# Patient Record
Sex: FEMALE | Race: BLACK OR AFRICAN AMERICAN | NOT HISPANIC OR LATINO | Employment: UNEMPLOYED | URBAN - METROPOLITAN AREA
[De-identification: names, ages, dates, MRNs, and addresses within clinical notes are randomized per-mention and may not be internally consistent; named-entity substitution may affect disease eponyms.]

---

## 2019-06-23 ENCOUNTER — HOSPITAL ENCOUNTER (EMERGENCY)
Facility: HOSPITAL | Age: 29
Discharge: HOME | End: 2019-06-23
Attending: EMERGENCY MEDICINE
Payer: MEDICAID

## 2019-06-23 VITALS
TEMPERATURE: 98 F | SYSTOLIC BLOOD PRESSURE: 127 MMHG | WEIGHT: 154 LBS | BODY MASS INDEX: 24.75 KG/M2 | RESPIRATION RATE: 16 BRPM | HEIGHT: 66 IN | OXYGEN SATURATION: 100 % | HEART RATE: 67 BPM | DIASTOLIC BLOOD PRESSURE: 67 MMHG

## 2019-06-23 DIAGNOSIS — N30.90 CYSTITIS: ICD-10-CM

## 2019-06-23 DIAGNOSIS — R30.0 DYSURIA: ICD-10-CM

## 2019-06-23 DIAGNOSIS — N76.0 BV (BACTERIAL VAGINOSIS): Primary | ICD-10-CM

## 2019-06-23 DIAGNOSIS — B96.89 BV (BACTERIAL VAGINOSIS): Primary | ICD-10-CM

## 2019-06-23 LAB
B-HCG UR QL: NEGATIVE
BACTERIA #/AREA URNS AUTO: ABNORMAL /HPF
BACTERIA GENITAL QL WET PREP: ABNORMAL
BILIRUB UR QL STRIP: NEGATIVE
CLARITY UR REFRACT.AUTO: ABNORMAL
CLUE CELLS VAG QL WET PREP: ABNORMAL
COLOR UR AUTO: YELLOW
CTP QC/QA: YES
FILAMENT FUNGI VAG WET PREP-#/AREA: ABNORMAL
GLUCOSE UR QL STRIP: NEGATIVE
HGB UR QL STRIP: NEGATIVE
KETONES UR QL STRIP: NEGATIVE
LEUKOCYTE ESTERASE UR QL STRIP: ABNORMAL
MICROSCOPIC COMMENT: ABNORMAL
NITRITE UR QL STRIP: NEGATIVE
PH UR STRIP: 7 [PH] (ref 5–8)
PROT UR QL STRIP: NEGATIVE
RBC #/AREA URNS AUTO: 2 /HPF (ref 0–4)
SP GR UR STRIP: 1.02 (ref 1–1.03)
SPECIMEN SOURCE: ABNORMAL
SQUAMOUS #/AREA URNS AUTO: 5 /HPF
T VAGINALIS GENITAL QL WET PREP: ABNORMAL
URN SPEC COLLECT METH UR: ABNORMAL
WBC #/AREA URNS AUTO: 76 /HPF (ref 0–5)
WBC #/AREA VAG WET PREP: ABNORMAL
YEAST GENITAL QL WET PREP: ABNORMAL

## 2019-06-23 PROCEDURE — 87491 CHLMYD TRACH DNA AMP PROBE: CPT

## 2019-06-23 PROCEDURE — 81025 URINE PREGNANCY TEST: CPT | Performed by: PHYSICIAN ASSISTANT

## 2019-06-23 PROCEDURE — 81001 URINALYSIS AUTO W/SCOPE: CPT

## 2019-06-23 PROCEDURE — 99284 EMERGENCY DEPT VISIT MOD MDM: CPT

## 2019-06-23 PROCEDURE — 99284 EMERGENCY DEPT VISIT MOD MDM: CPT | Mod: ,,, | Performed by: PHYSICIAN ASSISTANT

## 2019-06-23 PROCEDURE — 87086 URINE CULTURE/COLONY COUNT: CPT

## 2019-06-23 PROCEDURE — 87210 SMEAR WET MOUNT SALINE/INK: CPT

## 2019-06-23 PROCEDURE — 87088 URINE BACTERIA CULTURE: CPT

## 2019-06-23 PROCEDURE — 99284 PR EMERGENCY DEPT VISIT,LEVEL IV: ICD-10-PCS | Mod: ,,, | Performed by: PHYSICIAN ASSISTANT

## 2019-06-23 PROCEDURE — 25000003 PHARM REV CODE 250: Performed by: PHYSICIAN ASSISTANT

## 2019-06-23 RX ORDER — NITROFURANTOIN 25; 75 MG/1; MG/1
100 CAPSULE ORAL
Status: COMPLETED | OUTPATIENT
Start: 2019-06-23 | End: 2019-06-23

## 2019-06-23 RX ORDER — PHENAZOPYRIDINE HYDROCHLORIDE 100 MG/1
100 TABLET, FILM COATED ORAL
Status: COMPLETED | OUTPATIENT
Start: 2019-06-23 | End: 2019-06-23

## 2019-06-23 RX ORDER — PHENAZOPYRIDINE HYDROCHLORIDE 200 MG/1
200 TABLET, FILM COATED ORAL 3 TIMES DAILY
Qty: 5 TABLET | Refills: 0 | Status: SHIPPED | OUTPATIENT
Start: 2019-06-23 | End: 2019-06-25

## 2019-06-23 RX ORDER — NITROFURANTOIN 25; 75 MG/1; MG/1
100 CAPSULE ORAL 2 TIMES DAILY
Qty: 9 CAPSULE | Refills: 0 | Status: SHIPPED | OUTPATIENT
Start: 2019-06-23 | End: 2019-06-28

## 2019-06-23 RX ORDER — METRONIDAZOLE 500 MG/1
500 TABLET ORAL EVERY 12 HOURS
Qty: 14 TABLET | Refills: 0 | Status: SHIPPED | OUTPATIENT
Start: 2019-06-23 | End: 2019-06-30

## 2019-06-23 RX ADMIN — NITROFURANTOIN (MONOHYDRATE/MACROCRYSTALS) 100 MG: 75; 25 CAPSULE ORAL at 11:06

## 2019-06-23 RX ADMIN — PHENAZOPYRIDINE HYDROCHLORIDE 100 MG: 100 TABLET ORAL at 11:06

## 2019-06-23 NOTE — ED PROVIDER NOTES
"Encounter Date: 2019       History     Chief Complaint   Patient presents with    Female  Problem     LMP "late May"      Ms Sarmiento is a 28yoF presents for dysuria and vaginal discharge; pertinent PMHx  ( section x3).  Patient reports several days of dysuria, pelvic pressure while urinating, urinary frequency and mild amount of thick, white, non malodorous vaginal discharge. She says the symptoms feel similar to prior UTI.  Patient is sexually active with 1 partner and is not concerned for STDs at this time that would like to proceed with pelvic exam.  She recently stopped using the Depo-Provera shot and is not try and conceive at this time.  LMP late May.  She denies associated painful/painless vulvar lesion, fever/chills, decreased appetite, nausea/vomiting, abdominal pain, new back pain, melena, hematochezia, diarrhea, chest pain, shortness breath, cough, weakness, dizziness.  The patients available PMH, PSH, Social History, medications, allergies, and triage vital signs were reviewed just prior to their medical evaluation.  A ten point review of systems was completed and is negative except as documented above.  Patient denies any other acute medical complaint.          Review of patient's allergies indicates:  No Known Allergies  History reviewed. No pertinent past medical history.  Past Surgical History:   Procedure Laterality Date     SECTION      x3     History reviewed. No pertinent family history.  Social History     Tobacco Use    Smoking status: Never Smoker    Smokeless tobacco: Never Used   Substance Use Topics    Alcohol use: Yes    Drug use: Never     Review of Systems   Constitutional: Negative for appetite change, chills and fever.   HENT: Negative for sore throat and trouble swallowing.    Respiratory: Negative for cough and shortness of breath.    Cardiovascular: Negative for chest pain.   Gastrointestinal: Negative for abdominal pain, blood in stool, diarrhea, " nausea and vomiting.   Genitourinary: Positive for dysuria, frequency, urgency and vaginal discharge. Negative for decreased urine volume, difficulty urinating, flank pain, hematuria and vaginal bleeding. Pelvic pain: Cramping while urinating.   Musculoskeletal: Negative for back pain and neck pain.   Skin: Negative for pallor and rash.   Neurological: Negative for dizziness and weakness.       Physical Exam     Initial Vitals [06/23/19 1009]   BP Pulse Resp Temp SpO2   (!) 101/59 75 16 97.8 °F (36.6 °C) 99 %      MAP       --         Physical Exam    Vitals reviewed.  Constitutional: She appears well-developed and well-nourished. She is not diaphoretic. No distress.   Well-appearing female in NAD, VSS, afebrile, 99% on RA.     HENT:   Head: Normocephalic and atraumatic.   Right Ear: External ear normal.   Left Ear: External ear normal.   Nose: Nose normal.   Mouth/Throat: Oropharynx is clear and moist. No oropharyngeal exudate.   Eyes: Conjunctivae and EOM are normal. Pupils are equal, round, and reactive to light. No scleral icterus.   Neck: Normal range of motion. Neck supple.   Cardiovascular: Normal rate, regular rhythm and intact distal pulses.   Pulmonary/Chest: Breath sounds normal.   Abdominal: Soft. Bowel sounds are normal. There is no tenderness. There is no rebound and no guarding.   Genitourinary: Uterus normal. Pelvic exam was performed with patient prone. There is no tenderness or lesion on the right labia. There is no tenderness or lesion on the left labia. Cervix exhibits no motion tenderness and no friability. Right adnexum displays no mass and no fullness. Tenderness:  very mild. Left adnexum displays no mass, no tenderness and no fullness. Vaginal discharge (Mild, thin, white, consistent with physiologic discharge, no malodor) found.   Musculoskeletal: Normal range of motion. She exhibits no edema.   Neurological: She is alert and oriented to person, place, and time. She has normal strength. No  cranial nerve deficit or sensory deficit.   Skin: Skin is warm and dry. Capillary refill takes less than 2 seconds. No rash noted. No erythema. No pallor.   Psychiatric: She has a normal mood and affect. Her behavior is normal. Judgment and thought content normal.         ED Course   Procedures  Labs Reviewed   URINALYSIS, REFLEX TO URINE CULTURE - Abnormal; Notable for the following components:       Result Value    Appearance, UA Hazy (*)     Leukocytes, UA 2+ (*)     All other components within normal limits    Narrative:     Preferred Collection Type->Urine, Clean Catch   VAGINAL SCREEN - Abnormal; Notable for the following components:    Clue Cells Few (*)     WBC - Vaginal Screen Rare (*)     Bacteria - Vaginal Screen Few (*)     All other components within normal limits   URINALYSIS MICROSCOPIC - Abnormal; Notable for the following components:    WBC, UA 76 (*)     All other components within normal limits    Narrative:     Preferred Collection Type->Urine, Clean Catch   C. TRACHOMATIS/N. GONORRHOEAE BY AMP DNA   CULTURE, URINE   POCT URINE PREGNANCY          Imaging Results    None          Medical Decision Making:   History:   Old Medical Records: I decided to obtain old medical records.  Old Records Summarized: records from clinic visits and records from previous admission(s).  Initial Assessment:   The patient presents with dysuria and urinary frequency with mild amount of thin, white vaginal discharge on exam, VSS, afebrile, no constitutional symptoms  Differential Diagnosis:   DDx includes UTI, BV, physiologic discharge. Physical exam and history taking lower clinical suspicion for pregnancy, TOA, PID, pyelonephritis, ureterolithiasis.  Clinical Tests:   Lab Tests: Ordered and Reviewed  ED Management:  UA consistent with cystitis, no prior sensitivities.  Will cover with Macrobid and Pyridium, 1st dose in ED.  Wet prep shows few amount of clue cells with bacteria, will cover for BV given symptoms with  Flagyl. Patient agreed to plan of care and voiced understanding. Discharged in stable condition with strict ED return precautions.    Oliva Childs PA-C  06/23/2019    I discussed the following case, diagnosis and plan of care with attending physician.                        Clinical Impression:       ICD-10-CM ICD-9-CM   1. BV (bacterial vaginosis) N76.0 616.10    B96.89 041.9   2. Dysuria R30.0 788.1   3. Cystitis N30.90 595.9         Disposition:   Disposition: Discharged  Condition: Stable                        Oliva Childs PA-C  06/23/19 1208

## 2019-06-23 NOTE — DISCHARGE INSTRUCTIONS
Take all antibiotics to completion, even if symptoms improve.  Use Pyridium as needed for bladder pain. Return to the ED immediately if you develop severe back pain, fever or uncontrollable vomiting.    Our goal in the emergency department is to always give you outstanding care and exceptional service. You may receive a survey by mail or e-mail in the next week regarding your experience in our ED. We would greatly appreciate your completing and returning the survey. Your feedback provides us with a way to recognize our staff who give very good care and it helps us learn how to improve when your experience was below our aspiration of excellence.

## 2019-06-23 NOTE — ED TRIAGE NOTES
C/o frequent urination, pressure, pain with urination, and thick white discharge without odor. Pt reports some vaginal itching, denies sores or lesions. Denies n/v/d. Denies fever. Pt denies regular cycle, recently stopped using depo provera birth control shot, not attempting to conceive.

## 2019-06-24 LAB
BACTERIA UR CULT: NORMAL
C TRACH DNA SPEC QL NAA+PROBE: NOT DETECTED
N GONORRHOEA DNA SPEC QL NAA+PROBE: NOT DETECTED

## 2019-09-30 ENCOUNTER — HOSPITAL ENCOUNTER (EMERGENCY)
Facility: HOSPITAL | Age: 29
Discharge: HOME | End: 2019-09-30
Attending: EMERGENCY MEDICINE
Payer: MEDICAID

## 2019-09-30 VITALS
OXYGEN SATURATION: 98 % | RESPIRATION RATE: 15 BRPM | TEMPERATURE: 98 F | WEIGHT: 150 LBS | BODY MASS INDEX: 24.11 KG/M2 | HEART RATE: 72 BPM | SYSTOLIC BLOOD PRESSURE: 108 MMHG | DIASTOLIC BLOOD PRESSURE: 70 MMHG | HEIGHT: 66 IN

## 2019-09-30 DIAGNOSIS — B96.89 BV (BACTERIAL VAGINOSIS): Primary | ICD-10-CM

## 2019-09-30 DIAGNOSIS — N76.0 BV (BACTERIAL VAGINOSIS): Primary | ICD-10-CM

## 2019-09-30 LAB
B-HCG UR QL: NEGATIVE
BACTERIA GENITAL QL WET PREP: ABNORMAL
CLUE CELLS VAG QL WET PREP: ABNORMAL
CTP QC/QA: YES
FILAMENT FUNGI VAG WET PREP-#/AREA: ABNORMAL
SPECIMEN SOURCE: ABNORMAL
T VAGINALIS GENITAL QL WET PREP: ABNORMAL
WBC #/AREA VAG WET PREP: ABNORMAL
YEAST GENITAL QL WET PREP: ABNORMAL

## 2019-09-30 PROCEDURE — 81025 URINE PREGNANCY TEST: CPT | Performed by: PHYSICIAN ASSISTANT

## 2019-09-30 PROCEDURE — 99284 PR EMERGENCY DEPT VISIT,LEVEL IV: ICD-10-PCS | Mod: ,,, | Performed by: PHYSICIAN ASSISTANT

## 2019-09-30 PROCEDURE — 99284 EMERGENCY DEPT VISIT MOD MDM: CPT | Mod: 25

## 2019-09-30 PROCEDURE — 87210 SMEAR WET MOUNT SALINE/INK: CPT

## 2019-09-30 PROCEDURE — 99284 EMERGENCY DEPT VISIT MOD MDM: CPT | Mod: ,,, | Performed by: PHYSICIAN ASSISTANT

## 2019-09-30 PROCEDURE — 87491 CHLMYD TRACH DNA AMP PROBE: CPT

## 2019-09-30 RX ORDER — METRONIDAZOLE 500 MG/1
500 TABLET ORAL EVERY 12 HOURS
Qty: 14 TABLET | Refills: 0 | Status: SHIPPED | OUTPATIENT
Start: 2019-09-30 | End: 2019-10-07

## 2019-09-30 NOTE — ED PROVIDER NOTES
Encounter Date: 2019       History     Chief Complaint   Patient presents with    Vaginal Discharge     Reports vaginal discharge and possible foul smell     This is a 29-year-old female with no known significant past medical history who presents to the ED with a chief complaint of vaginal discharge. Patient reports last menstrual cycle 2 weeks ago.  She reports a one-week history of white vaginal discharge.  Patient denies fever, chills, nausea/vomiting, abdominal pain, pelvic pain vaginal irritation, dysuria, diarrhea, vaginal lesions.  She is in a monogamous relationship with 1 partner and reports using protection with intercourse.  Denies concern for sexually transmitted infection.        Review of patient's allergies indicates:  No Known Allergies  No past medical history on file.  Past Surgical History:   Procedure Laterality Date     SECTION      x3     No family history on file.  Social History     Tobacco Use    Smoking status: Never Smoker    Smokeless tobacco: Never Used   Substance Use Topics    Alcohol use: Yes    Drug use: Never     Review of Systems   Constitutional: Negative for fever.   HENT: Negative for sore throat.    Respiratory: Negative for shortness of breath.    Cardiovascular: Negative for chest pain.   Gastrointestinal: Negative for nausea and vomiting.   Genitourinary: Positive for vaginal discharge. Negative for dysuria, menstrual problem, pelvic pain and vaginal bleeding.   Musculoskeletal: Negative for back pain.   Skin: Negative for rash.   Neurological: Negative for weakness.   Hematological: Does not bruise/bleed easily.       Physical Exam     Initial Vitals [19 1443]   BP Pulse Resp Temp SpO2   108/70 72 15 98 °F (36.7 °C) 98 %      MAP       --         Physical Exam    Constitutional: She appears well-developed and well-nourished. No distress.   HENT:   Head: Atraumatic.   Eyes: Conjunctivae and EOM are normal. Pupils are equal, round, and reactive to  light.   Cardiovascular: Normal rate, regular rhythm and normal heart sounds.   Pulmonary/Chest: Breath sounds normal. No respiratory distress. She has no wheezes. She has no rhonchi. She has no rales.   Abdominal: Soft. Bowel sounds are normal. There is no tenderness. There is no rebound and no guarding.   Genitourinary: There is no rash on the right labia. There is no rash on the left labia. Uterus is not tender. Cervix exhibits no motion tenderness and no friability. Right adnexum displays no tenderness. Left adnexum displays no tenderness. No erythema or bleeding in the vagina. Vaginal discharge found.   Neurological: She is alert and oriented to person, place, and time.   Skin: Skin is warm and dry. No rash noted.         ED Course   Procedures  Labs Reviewed   VAGINAL SCREEN - Abnormal; Notable for the following components:       Result Value    Clue Cells Moderate (*)     WBC - Vaginal Screen Rare (*)     Bacteria - Vaginal Screen Many (*)     All other components within normal limits   C. TRACHOMATIS/N. GONORRHOEAE BY AMP DNA   POCT URINE PREGNANCY          Imaging Results    None          Medical Decision Making:   Clinical Tests:   Lab Tests: Ordered and Reviewed       APC / Resident Notes:   29-year-old female presenting with vaginal discharge.  On exam she is afebrile and nontoxic.  Abdomen is soft, nontender.  Pelvic exam demonstrates white vaginal discharge with no cervical motion tenderness or adnexal tenderness.  No lesions.    DDx includes but is not limited to vaginitis, cervicitis, PID, pregnancy.    Patient was offered empiric treatment for GC/Chlamydia but declined.  Urine pregnancy is negative.  Vaginal screen positive for clue cells.  Discussed with patient.  She will be discharged with a 7 day course of Flagyl.  GC/chlamydia culture is pending.  Patient advised to follow up with her PCP.  Return to ED precautions given.  She is stable for discharge. I discussed the care of this patient with  my supervising MD.                  Clinical Impression:       ICD-10-CM ICD-9-CM   1. BV (bacterial vaginosis) N76.0 616.10    B96.89 041.9         Disposition:   Disposition: Discharged  Condition: Stable                        Alia Carreno PA-C  09/30/19 2421

## 2019-09-30 NOTE — ED NOTES
Patient arrives for evaluation of white vaginal discharge x 1 week without pain - denies pain on urination - denies lesions or rash or itching - patient states she uses protection during sexual intercourse and last menstrual period was 1 week

## 2019-10-01 LAB
C TRACH DNA SPEC QL NAA+PROBE: NOT DETECTED
N GONORRHOEA DNA SPEC QL NAA+PROBE: NOT DETECTED

## 2020-03-05 ENCOUNTER — HOSPITAL ENCOUNTER (EMERGENCY)
Facility: HOSPITAL | Age: 30
Discharge: HOME | End: 2020-03-05
Attending: EMERGENCY MEDICINE
Payer: MEDICAID

## 2020-03-05 VITALS
WEIGHT: 160 LBS | HEART RATE: 62 BPM | HEIGHT: 66 IN | OXYGEN SATURATION: 99 % | DIASTOLIC BLOOD PRESSURE: 87 MMHG | TEMPERATURE: 99 F | SYSTOLIC BLOOD PRESSURE: 129 MMHG | RESPIRATION RATE: 20 BRPM | BODY MASS INDEX: 25.71 KG/M2

## 2020-03-05 DIAGNOSIS — N93.9 VAGINAL BLEEDING: Primary | ICD-10-CM

## 2020-03-05 DIAGNOSIS — N39.0 URINARY TRACT INFECTION WITH HEMATURIA, SITE UNSPECIFIED: ICD-10-CM

## 2020-03-05 DIAGNOSIS — R31.9 URINARY TRACT INFECTION WITH HEMATURIA, SITE UNSPECIFIED: ICD-10-CM

## 2020-03-05 LAB
B-HCG UR QL: NEGATIVE
BACTERIA #/AREA URNS AUTO: ABNORMAL /HPF
BACTERIA GENITAL QL WET PREP: ABNORMAL
BASOPHILS # BLD AUTO: 0.02 K/UL (ref 0–0.2)
BASOPHILS NFR BLD: 0.5 % (ref 0–1.9)
BILIRUB UR QL STRIP: NEGATIVE
C TRACH DNA SPEC QL NAA+PROBE: NOT DETECTED
CLARITY UR REFRACT.AUTO: ABNORMAL
CLUE CELLS VAG QL WET PREP: ABNORMAL
COLOR UR AUTO: ABNORMAL
CTP QC/QA: YES
DIFFERENTIAL METHOD: ABNORMAL
EOSINOPHIL # BLD AUTO: 0 K/UL (ref 0–0.5)
EOSINOPHIL NFR BLD: 0.5 % (ref 0–8)
ERYTHROCYTE [DISTWIDTH] IN BLOOD BY AUTOMATED COUNT: 17.1 % (ref 11.5–14.5)
FILAMENT FUNGI VAG WET PREP-#/AREA: ABNORMAL
GLUCOSE UR QL STRIP: ABNORMAL
HCT VFR BLD AUTO: 34.1 % (ref 37–48.5)
HGB BLD-MCNC: 9.7 G/DL (ref 12–16)
HGB UR QL STRIP: ABNORMAL
HYALINE CASTS UR QL AUTO: 0 /LPF
IMM GRANULOCYTES # BLD AUTO: 0.01 K/UL (ref 0–0.04)
IMM GRANULOCYTES NFR BLD AUTO: 0.2 % (ref 0–0.5)
KETONES UR QL STRIP: NEGATIVE
LEUKOCYTE ESTERASE UR QL STRIP: NEGATIVE
LYMPHOCYTES # BLD AUTO: 1.8 K/UL (ref 1–4.8)
LYMPHOCYTES NFR BLD: 45.3 % (ref 18–48)
MCH RBC QN AUTO: 24.3 PG (ref 27–31)
MCHC RBC AUTO-ENTMCNC: 28.4 G/DL (ref 32–36)
MCV RBC AUTO: 85 FL (ref 82–98)
MICROSCOPIC COMMENT: ABNORMAL
MONOCYTES # BLD AUTO: 0.3 K/UL (ref 0.3–1)
MONOCYTES NFR BLD: 7.7 % (ref 4–15)
N GONORRHOEA DNA SPEC QL NAA+PROBE: NOT DETECTED
NEUTROPHILS # BLD AUTO: 1.9 K/UL (ref 1.8–7.7)
NEUTROPHILS NFR BLD: 45.8 % (ref 38–73)
NITRITE UR QL STRIP: NEGATIVE
NRBC BLD-RTO: 0 /100 WBC
PH UR STRIP: 6 [PH] (ref 5–8)
PLATELET # BLD AUTO: 293 K/UL (ref 150–350)
PMV BLD AUTO: 10 FL (ref 9.2–12.9)
PROT UR QL STRIP: ABNORMAL
RBC # BLD AUTO: 4 M/UL (ref 4–5.4)
RBC #/AREA URNS AUTO: >100 /HPF (ref 0–4)
SP GR UR STRIP: 1.03 (ref 1–1.03)
SPECIMEN SOURCE: ABNORMAL
SQUAMOUS #/AREA URNS AUTO: 30 /HPF
T VAGINALIS GENITAL QL WET PREP: ABNORMAL
URN SPEC COLLECT METH UR: ABNORMAL
WBC # BLD AUTO: 4.04 K/UL (ref 3.9–12.7)
WBC #/AREA URNS AUTO: 7 /HPF (ref 0–5)
WBC #/AREA VAG WET PREP: ABNORMAL
YEAST GENITAL QL WET PREP: ABNORMAL

## 2020-03-05 PROCEDURE — 81001 URINALYSIS AUTO W/SCOPE: CPT

## 2020-03-05 PROCEDURE — 85025 COMPLETE CBC W/AUTO DIFF WBC: CPT

## 2020-03-05 PROCEDURE — 99284 PR EMERGENCY DEPT VISIT,LEVEL IV: ICD-10-PCS | Mod: ,,, | Performed by: EMERGENCY MEDICINE

## 2020-03-05 PROCEDURE — 25000003 PHARM REV CODE 250: Performed by: EMERGENCY MEDICINE

## 2020-03-05 PROCEDURE — 96372 THER/PROPH/DIAG INJ SC/IM: CPT

## 2020-03-05 PROCEDURE — 87210 SMEAR WET MOUNT SALINE/INK: CPT

## 2020-03-05 PROCEDURE — 99284 EMERGENCY DEPT VISIT MOD MDM: CPT | Mod: 25

## 2020-03-05 PROCEDURE — 87491 CHLMYD TRACH DNA AMP PROBE: CPT

## 2020-03-05 PROCEDURE — 99284 EMERGENCY DEPT VISIT MOD MDM: CPT | Mod: ,,, | Performed by: EMERGENCY MEDICINE

## 2020-03-05 PROCEDURE — 81025 URINE PREGNANCY TEST: CPT | Performed by: EMERGENCY MEDICINE

## 2020-03-05 PROCEDURE — 63600175 PHARM REV CODE 636 W HCPCS: Performed by: EMERGENCY MEDICINE

## 2020-03-05 RX ORDER — AZITHROMYCIN 250 MG/1
1000 TABLET, FILM COATED ORAL
Status: COMPLETED | OUTPATIENT
Start: 2020-03-05 | End: 2020-03-05

## 2020-03-05 RX ORDER — CEFTRIAXONE 500 MG/1
500 INJECTION, POWDER, FOR SOLUTION INTRAMUSCULAR; INTRAVENOUS
Status: COMPLETED | OUTPATIENT
Start: 2020-03-05 | End: 2020-03-05

## 2020-03-05 RX ORDER — NITROFURANTOIN 25; 75 MG/1; MG/1
100 CAPSULE ORAL 2 TIMES DAILY
Qty: 10 CAPSULE | Refills: 0 | Status: SHIPPED | OUTPATIENT
Start: 2020-03-05 | End: 2020-03-10

## 2020-03-05 RX ADMIN — AZITHROMYCIN MONOHYDRATE 1000 MG: 250 TABLET ORAL at 01:03

## 2020-03-05 RX ADMIN — CEFTRIAXONE SODIUM 500 MG: 500 INJECTION, POWDER, FOR SOLUTION INTRAMUSCULAR; INTRAVENOUS at 01:03

## 2020-03-05 NOTE — ED PROVIDER NOTES
Encounter Date: 3/5/2020    SCRIBE #1 NOTE: I, Darshana Maurer, am scribing for, and in the presence of,  Dr. Madrid. I have scribed the entire note.       History     Chief Complaint   Patient presents with    Vaginal Bleeding     +UPT, vaginal bleeding and cramping onset today     Time patient was seen by the provider: 11:45 AM      The patient is a 29 y.o. Female, , who presents to the ED with a complaint of vaginal bleeding upon waking up this morning. The patient reports bleeding gradually worsened throughout the morning. Notes of white vaginal discharge that began a few days ago. She states she was incarcerated a few days ago and had 3 pregnancy tests done which were all positive. Her last menstrual period was approximately one month ago. All her prior pregnancies were normal, she had c-sections for all 3. Denies fever, chills, diarrhea, constipation, abdominal pain, dysuria, vaginal pain, vaginal sores or ulcers.       The history is provided by the patient and medical records.     Review of patient's allergies indicates:  No Known Allergies  History reviewed. No pertinent past medical history.  Past Surgical History:   Procedure Laterality Date     SECTION      x3     History reviewed. No pertinent family history.  Social History     Tobacco Use    Smoking status: Never Smoker    Smokeless tobacco: Never Used   Substance Use Topics    Alcohol use: Not Currently    Drug use: Never     Review of Systems   Constitutional: Negative for activity change, chills, diaphoresis and fever.   HENT: Negative for drooling, rhinorrhea, sore throat and trouble swallowing.    Eyes: Negative for pain and visual disturbance.   Respiratory: Negative for cough, shortness of breath and stridor.    Cardiovascular: Negative for chest pain.   Gastrointestinal: Negative for abdominal pain, constipation, diarrhea and nausea.   Genitourinary: Positive for vaginal bleeding and vaginal discharge. Negative for  dysuria, genital sores and vaginal pain.   Musculoskeletal: Negative for back pain.   Skin: Negative for rash.   Neurological: Negative for weakness.   Hematological: Does not bruise/bleed easily.       Physical Exam     Initial Vitals [03/05/20 1131]   BP Pulse Resp Temp SpO2   129/87 62 20 98.6 °F (37 °C) 99 %      MAP       --         Physical Exam    Nursing note and vitals reviewed.  Constitutional: She appears well-developed. She is not diaphoretic. No distress.   HENT:   Head: Normocephalic and atraumatic.   Nose: Nose normal.   Eyes: EOM are normal. No scleral icterus.   Neck: Normal range of motion. Neck supple.   Cardiovascular: Normal rate, regular rhythm, normal heart sounds and intact distal pulses. Exam reveals no gallop and no friction rub.    No murmur heard.  Pulmonary/Chest: Breath sounds normal. No stridor. No respiratory distress. She has no wheezes. She has no rhonchi. She has no rales.   Abdominal: Soft. Bowel sounds are normal. There is no tenderness.   Genitourinary:   Genitourinary Comments: Scant blood in vaginal vault. No abnormal vaginal discharge. No cervix motion tenderness.    Musculoskeletal: Normal range of motion.   Neurological: She is alert and oriented to person, place, and time. No cranial nerve deficit.   Skin: Skin is warm and dry. Capillary refill takes less than 2 seconds. No rash noted.   Psychiatric: She has a normal mood and affect.         ED Course   Procedures  Labs Reviewed   URINALYSIS, REFLEX TO URINE CULTURE - Abnormal; Notable for the following components:       Result Value    Color, UA Red (*)     Appearance, UA Cloudy (*)     Protein, UA 2+ (*)     Glucose, UA 1+ (*)     Occult Blood UA 3+ (*)     All other components within normal limits    Narrative:     Preferred Collection Type->Urine, Clean Catch   VAGINAL SCREEN - Abnormal; Notable for the following components:    WBC - Vaginal Screen Rare (*)     Bacteria - Vaginal Screen Rare (*)     All other  components within normal limits   CBC W/ AUTO DIFFERENTIAL - Abnormal; Notable for the following components:    Hemoglobin 9.7 (*)     Hematocrit 34.1 (*)     Mean Corpuscular Hemoglobin 24.3 (*)     Mean Corpuscular Hemoglobin Conc 28.4 (*)     RDW 17.1 (*)     All other components within normal limits   URINALYSIS MICROSCOPIC - Abnormal; Notable for the following components:    RBC, UA >100 (*)     WBC, UA 7 (*)     Bacteria Many (*)     All other components within normal limits    Narrative:     Preferred Collection Type->Urine, Clean Catch   C. TRACHOMATIS/N. GONORRHOEAE BY AMP DNA   POCT URINE PREGNANCY          Imaging Results    None          Medical Decision Making:   History:   Old Medical Records: I decided to obtain old medical records.  Clinical Tests:   Lab Tests: Ordered and Reviewed  ED Management:  29 year old female presents with 1 days vaginal bleeding most likely of nonemergent etiology. After negative urine pregnancy testing, CBC with only mild anemia, and benign pelvic exam, patient's bleeding thought to be secondary to fibroids or other non-emergent cause of abnormal uterine bleeding.    Hemorrhagic cystitis unlikely: No dysuria, grossly visualized blood per vagina  Unlikely PCOS, pituitary dysregulation, hypothyroidism: No changes in hair distribution, no weight change, no change in acne, no visual changes, no thermoregulatory changes, no new unexplained edema  Unlikely coagulopathy (vWD, ITP): No anticoagulation medication, PLTs nml, no other mucosal bleeding  Unlikely traumatic vaginal bleeding: no history of instrumentation, hard sex, or abuse, pelvic exam shows no signs of trauma, no foreign body visualized  Unlikely other infectious etiology: nonseptic appearance, no recent h/o instrumentation    Plan discharge home with return precautions, instruction for prompt outpatient follow, and iron supplementation (324mg ferrous sulfate tablet PO TID).              Scribe Attestation:   Scribe  #1: I performed the above scribed service and the documentation accurately describes the services I performed. I attest to the accuracy of the note.                          Clinical Impression:       ICD-10-CM ICD-9-CM   1. Vaginal bleeding N93.9 623.8   2. Urinary tract infection with hematuria, site unspecified N39.0 599.0    R31.9 599.70         Disposition:   Disposition: Discharged  Condition: Stable     ED Disposition Condition    Discharge Stable        ED Prescriptions     Medication Sig Dispense Start Date End Date Auth. Provider    nitrofurantoin, macrocrystal-monohydrate, (MACROBID) 100 MG capsule Take 1 capsule (100 mg total) by mouth 2 (two) times daily. for 5 days 10 capsule 3/5/2020 3/10/2020 Kai Madrid MD        Follow-up Information    None                                    Kai Madrid MD  03/05/20 3888

## 2020-03-05 NOTE — ED NOTES
Patient identifiers for Gilmer Sarmiento 29 y.o. female checked and correct.  Chief Complaint   Patient presents with    Vaginal Bleeding     +UPT, vaginal bleeding and cramping onset today     History reviewed. No pertinent past medical history.  Allergies reported: Review of patient's allergies indicates:  No Known Allergies      LOC: Patient is awake, alert, and aware of environment with an appropriate affect. Patient is oriented x 4 and speaking appropriately.  APPEARANCE: Patient resting comfortably and in no acute distress. Patient is clean and well groomed, patient's clothing is properly fastened.  SKIN: The skin is warm and dry. Patient has normal skin turgor and moist mucus membranes. Denies fever or chills.  MUSKULOSKELETAL: Patient is moving all extremities well, no obvious deformities noted. Pulses intact. Ambulatory by self.  RESPIRATORY: Airway is open and patent. Respirations are spontaneous and non-labored with normal effort and rate. Denies SOB.  CARDIAC: Patient has a normal rate and rhythm. 62 on cardiac monitor. No peripheral edema noted. Denies chest pain.  ABDOMEN: No distention noted. Soft and non-tender upon palpation. Patient believes she is having a miscarriage; states went to longterm and received a pregnancy test that was positive. Bleeding and abdominal cramping began today around 1000. States a lot of blood and small clots being passed. States abdominal pain is 6/10. Localized to lower pelvic area.  NEUROLOGICAL: PERRL. Facial expression is symmetrical. Hand grasps are equal bilaterally. Normal sensation in all extremities when touched with finger.

## 2020-09-08 ENCOUNTER — ANESTHESIA (OUTPATIENT)
Dept: SURGERY | Facility: OTHER | Age: 30
End: 2020-09-08
Payer: MEDICAID

## 2020-09-08 ENCOUNTER — ANESTHESIA EVENT (OUTPATIENT)
Dept: SURGERY | Facility: OTHER | Age: 30
End: 2020-09-08
Payer: MEDICAID

## 2020-09-08 ENCOUNTER — HOSPITAL ENCOUNTER (OUTPATIENT)
Facility: OTHER | Age: 30
Discharge: HOME | End: 2020-09-09
Attending: EMERGENCY MEDICINE | Admitting: EMERGENCY MEDICINE
Payer: MEDICAID

## 2020-09-08 DIAGNOSIS — O00.101 RIGHT TUBAL PREGNANCY WITHOUT INTRAUTERINE PREGNANCY: ICD-10-CM

## 2020-09-08 DIAGNOSIS — O00.90 ECTOPIC PREGNANCY, UNSPECIFIED LOCATION, UNSPECIFIED WHETHER INTRAUTERINE PREGNANCY PRESENT: Primary | ICD-10-CM

## 2020-09-08 PROCEDURE — 88305 TISSUE EXAM BY PATHOLOGIST: CPT | Mod: 26,,, | Performed by: PATHOLOGY

## 2020-09-08 PROCEDURE — 25000003 PHARM REV CODE 250: Performed by: NURSE ANESTHETIST, CERTIFIED REGISTERED

## 2020-09-08 PROCEDURE — 99285 EMERGENCY DEPT VISIT HI MDM: CPT | Mod: 25

## 2020-09-08 PROCEDURE — 37000009 HC ANESTHESIA EA ADD 15 MINS: Performed by: OBSTETRICS & GYNECOLOGY

## 2020-09-08 PROCEDURE — 00840 ANES IPER PX LOWER ABD NOS: CPT | Performed by: OBSTETRICS & GYNECOLOGY

## 2020-09-08 PROCEDURE — 36000708 HC OR TIME LEV III 1ST 15 MIN: Performed by: OBSTETRICS & GYNECOLOGY

## 2020-09-08 PROCEDURE — 37000008 HC ANESTHESIA 1ST 15 MINUTES: Performed by: OBSTETRICS & GYNECOLOGY

## 2020-09-08 PROCEDURE — 25000003 PHARM REV CODE 250: Performed by: OBSTETRICS & GYNECOLOGY

## 2020-09-08 PROCEDURE — 36000709 HC OR TIME LEV III EA ADD 15 MIN: Performed by: OBSTETRICS & GYNECOLOGY

## 2020-09-08 PROCEDURE — 63600175 PHARM REV CODE 636 W HCPCS

## 2020-09-08 PROCEDURE — 63600175 PHARM REV CODE 636 W HCPCS: Performed by: NURSE ANESTHETIST, CERTIFIED REGISTERED

## 2020-09-08 PROCEDURE — 88305 TISSUE EXAM BY PATHOLOGIST: CPT | Performed by: PATHOLOGY

## 2020-09-08 PROCEDURE — 88305 TISSUE EXAM BY PATHOLOGIST: ICD-10-PCS | Mod: 26,,, | Performed by: PATHOLOGY

## 2020-09-08 PROCEDURE — 63600175 PHARM REV CODE 636 W HCPCS: Performed by: OBSTETRICS & GYNECOLOGY

## 2020-09-08 PROCEDURE — 63600175 PHARM REV CODE 636 W HCPCS: Performed by: STUDENT IN AN ORGANIZED HEALTH CARE EDUCATION/TRAINING PROGRAM

## 2020-09-08 PROCEDURE — 63600175 PHARM REV CODE 636 W HCPCS: Performed by: ANESTHESIOLOGY

## 2020-09-08 PROCEDURE — 27201423 OPTIME MED/SURG SUP & DEVICES STERILE SUPPLY: Performed by: OBSTETRICS & GYNECOLOGY

## 2020-09-08 PROCEDURE — 25000003 PHARM REV CODE 250: Performed by: ANESTHESIOLOGY

## 2020-09-08 PROCEDURE — 71000033 HC RECOVERY, INTIAL HOUR: Performed by: OBSTETRICS & GYNECOLOGY

## 2020-09-08 RX ORDER — LIDOCAINE HCL/PF 100 MG/5ML
SYRINGE (ML) INTRAVENOUS
Status: DISCONTINUED | OUTPATIENT
Start: 2020-09-08 | End: 2020-09-08

## 2020-09-08 RX ORDER — ROCURONIUM BROMIDE 10 MG/ML
INJECTION, SOLUTION INTRAVENOUS
Status: DISCONTINUED | OUTPATIENT
Start: 2020-09-08 | End: 2020-09-08

## 2020-09-08 RX ORDER — HYDROMORPHONE HYDROCHLORIDE 2 MG/ML
0.4 INJECTION, SOLUTION INTRAMUSCULAR; INTRAVENOUS; SUBCUTANEOUS EVERY 5 MIN PRN
Status: DISCONTINUED | OUTPATIENT
Start: 2020-09-08 | End: 2020-09-09 | Stop reason: HOSPADM

## 2020-09-08 RX ORDER — LIDOCAINE HYDROCHLORIDE 10 MG/ML
INJECTION, SOLUTION EPIDURAL; INFILTRATION; INTRACAUDAL; PERINEURAL
Status: DISCONTINUED | OUTPATIENT
Start: 2020-09-08 | End: 2020-09-08 | Stop reason: HOSPADM

## 2020-09-08 RX ORDER — MEPERIDINE HYDROCHLORIDE 25 MG/ML
12.5 INJECTION INTRAMUSCULAR; INTRAVENOUS; SUBCUTANEOUS ONCE AS NEEDED
Status: DISCONTINUED | OUTPATIENT
Start: 2020-09-08 | End: 2020-09-09 | Stop reason: HOSPADM

## 2020-09-08 RX ORDER — ONDANSETRON 2 MG/ML
INJECTION INTRAMUSCULAR; INTRAVENOUS
Status: DISCONTINUED | OUTPATIENT
Start: 2020-09-08 | End: 2020-09-08

## 2020-09-08 RX ORDER — IBUPROFEN 800 MG/1
800 TABLET ORAL EVERY 8 HOURS PRN
Qty: 30 TABLET | Refills: 2 | Status: SHIPPED | OUTPATIENT
Start: 2020-09-08

## 2020-09-08 RX ORDER — DEXAMETHASONE SODIUM PHOSPHATE 4 MG/ML
INJECTION, SOLUTION INTRA-ARTICULAR; INTRALESIONAL; INTRAMUSCULAR; INTRAVENOUS; SOFT TISSUE
Status: DISCONTINUED | OUTPATIENT
Start: 2020-09-08 | End: 2020-09-08

## 2020-09-08 RX ORDER — PROPOFOL 10 MG/ML
VIAL (ML) INTRAVENOUS
Status: DISCONTINUED | OUTPATIENT
Start: 2020-09-08 | End: 2020-09-08

## 2020-09-08 RX ORDER — MUPIROCIN 20 MG/G
OINTMENT TOPICAL
Status: DISCONTINUED | OUTPATIENT
Start: 2020-09-08 | End: 2020-09-09 | Stop reason: HOSPADM

## 2020-09-08 RX ORDER — OXYCODONE AND ACETAMINOPHEN 5; 325 MG/1; MG/1
1 TABLET ORAL EVERY 4 HOURS PRN
Qty: 14 TABLET | Refills: 0 | Status: SHIPPED | OUTPATIENT
Start: 2020-09-08

## 2020-09-08 RX ORDER — ONDANSETRON 2 MG/ML
4 INJECTION INTRAMUSCULAR; INTRAVENOUS DAILY PRN
Status: DISCONTINUED | OUTPATIENT
Start: 2020-09-08 | End: 2020-09-09 | Stop reason: HOSPADM

## 2020-09-08 RX ORDER — DIPHENHYDRAMINE HYDROCHLORIDE 50 MG/ML
25 INJECTION INTRAMUSCULAR; INTRAVENOUS EVERY 6 HOURS PRN
Status: DISCONTINUED | OUTPATIENT
Start: 2020-09-08 | End: 2020-09-09 | Stop reason: HOSPADM

## 2020-09-08 RX ORDER — FENTANYL CITRATE 50 UG/ML
INJECTION, SOLUTION INTRAMUSCULAR; INTRAVENOUS
Status: DISCONTINUED | OUTPATIENT
Start: 2020-09-08 | End: 2020-09-08

## 2020-09-08 RX ORDER — SODIUM CHLORIDE, SODIUM LACTATE, POTASSIUM CHLORIDE, CALCIUM CHLORIDE 600; 310; 30; 20 MG/100ML; MG/100ML; MG/100ML; MG/100ML
INJECTION, SOLUTION INTRAVENOUS CONTINUOUS PRN
Status: DISCONTINUED | OUTPATIENT
Start: 2020-09-08 | End: 2020-09-08

## 2020-09-08 RX ORDER — OXYCODONE HYDROCHLORIDE 5 MG/1
5 TABLET ORAL
Status: DISCONTINUED | OUTPATIENT
Start: 2020-09-08 | End: 2020-09-09 | Stop reason: HOSPADM

## 2020-09-08 RX ORDER — SODIUM CHLORIDE 0.9 % (FLUSH) 0.9 %
3 SYRINGE (ML) INJECTION
Status: DISCONTINUED | OUTPATIENT
Start: 2020-09-08 | End: 2020-09-09 | Stop reason: HOSPADM

## 2020-09-08 RX ORDER — SODIUM CHLORIDE 9 MG/ML
INJECTION, SOLUTION INTRAVENOUS CONTINUOUS
Status: DISCONTINUED | OUTPATIENT
Start: 2020-09-08 | End: 2020-09-09 | Stop reason: HOSPADM

## 2020-09-08 RX ADMIN — PROPOFOL 200 MG: 10 INJECTION, EMULSION INTRAVENOUS at 09:09

## 2020-09-08 RX ADMIN — SODIUM CHLORIDE, SODIUM LACTATE, POTASSIUM CHLORIDE, AND CALCIUM CHLORIDE: .6; .31; .03; .02 INJECTION, SOLUTION INTRAVENOUS at 10:09

## 2020-09-08 RX ADMIN — ROCURONIUM BROMIDE 50 MG: 10 INJECTION, SOLUTION INTRAVENOUS at 09:09

## 2020-09-08 RX ADMIN — HYDROMORPHONE HYDROCHLORIDE 0.4 MG: 2 INJECTION INTRAMUSCULAR; INTRAVENOUS; SUBCUTANEOUS at 11:09

## 2020-09-08 RX ADMIN — ONDANSETRON HYDROCHLORIDE 4 MG: 2 INJECTION INTRAMUSCULAR; INTRAVENOUS at 09:09

## 2020-09-08 RX ADMIN — SODIUM CHLORIDE, SODIUM LACTATE, POTASSIUM CHLORIDE, AND CALCIUM CHLORIDE: .6; .31; .03; .02 INJECTION, SOLUTION INTRAVENOUS at 09:09

## 2020-09-08 RX ADMIN — FENTANYL CITRATE 100 MCG: 50 INJECTION, SOLUTION INTRAMUSCULAR; INTRAVENOUS at 09:09

## 2020-09-08 RX ADMIN — LIDOCAINE HYDROCHLORIDE 100 MG: 20 INJECTION, SOLUTION INTRAVENOUS at 09:09

## 2020-09-08 RX ADMIN — HYDROMORPHONE HYDROCHLORIDE 0.4 MG: 2 INJECTION INTRAMUSCULAR; INTRAVENOUS; SUBCUTANEOUS at 10:09

## 2020-09-08 RX ADMIN — PROMETHAZINE HYDROCHLORIDE 6.25 MG: 25 INJECTION INTRAMUSCULAR; INTRAVENOUS at 11:09

## 2020-09-08 RX ADMIN — SUGAMMADEX 400 MG: 100 INJECTION, SOLUTION INTRAVENOUS at 10:09

## 2020-09-08 RX ADMIN — DEXAMETHASONE SODIUM PHOSPHATE 8 MG: 4 INJECTION, SOLUTION INTRAMUSCULAR; INTRAVENOUS at 09:09

## 2020-09-09 ENCOUNTER — NURSE TRIAGE (OUTPATIENT)
Dept: ADMINISTRATIVE | Facility: CLINIC | Age: 30
End: 2020-09-09

## 2020-09-09 VITALS
WEIGHT: 173 LBS | TEMPERATURE: 98 F | DIASTOLIC BLOOD PRESSURE: 58 MMHG | HEART RATE: 75 BPM | SYSTOLIC BLOOD PRESSURE: 114 MMHG | OXYGEN SATURATION: 96 % | HEIGHT: 66 IN | RESPIRATION RATE: 18 BRPM | BODY MASS INDEX: 27.8 KG/M2

## 2020-09-09 PROCEDURE — G0378 HOSPITAL OBSERVATION PER HR: HCPCS

## 2020-09-09 NOTE — DISCHARGE SUMMARY
[facility]  Brief Op Note/Discharge Note  Short Stay    Admit Date: 9/8/2020    Discharge Date: 09/08/2020    Attending Physician: No att. providers found     Surgery Date: 9/8/2020     Surgeon(s) and Role:     * Ruthann Masters MD - Primary     * Jeny Briggs MD - Resident - Assisting        Pre-op Diagnosis:  Ectopic pregnancy, unspecified location, unspecified whether intrauterine pregnancy present [O00.90]    Post-op Diagnosis:  Post-Op Diagnosis Codes:     * Ectopic pregnancy, unspecified location, unspecified whether intrauterine pregnancy present [O00.90]    Procedure(s) (LRB):  LAPAROSCOPY, DIAGNOSTIC (N/A)  INSERTION, INTRAUTERINE DEVICE (N/A)    Anesthesia: Choice    Findings/Key Components:   1. Uterus gently sounded to 7cm  2. Grossly normal uterus, left fallopian tube and ovary, and right ovary. Ectopic pregnancy noted in right fallopian tube. No adhesive disease noted  3. Right salpingectomy performed without difficulty   4. Mirena IUD placed at the conclusion of the case     Estimated Blood Loss: 5cc       Specimens:   Specimen (12h ago, onward)    None          Discharge Provider: Jeny Briggs    Diagnoses:  Active Hospital Problems    Diagnosis  POA    Ectopic pregnancy [O00.90]  Unknown      Resolved Hospital Problems   No resolved problems to display.       Discharged Condition: good    Hospital Course:   Patient was admitted for outpatient procedure as above, and tolerated the procedure well with no complications. Please see operative report for further details. Following the procedure, the patient was awakened from anesthesia and transferred to the recovery area in stable condition. She was discharged to home once ambulating, voiding, tolerating PO intake, and pain was well-controlled. Patient was given routine post-op instructions and prescriptions for pain medication to take as needed. Patient instructed to follow up with GYN f/u clinic with Dr. Christiansen  in 2 weeks.    Final  Diagnoses: s/p right salpingectomy    Disposition: Home or Self Care    Follow up/Patient Instructions:    Medications:  Reconciled Home Medications:      Medication List      START taking these medications    ibuprofen 800 MG tablet  Commonly known as: ADVIL,MOTRIN  Take 1 tablet (800 mg total) by mouth every 8 (eight) hours as needed for Pain.     oxyCODONE-acetaminophen 5-325 mg per tablet  Commonly known as: PERCOCET  Take 1 tablet by mouth every 4 (four) hours as needed for Pain.          Discharge Procedure Orders   Diet general     Pelvic Rest   Order Comments: For 6 weeks     Call MD for:  temperature >100.4     Call MD for:  persistent nausea and vomiting     Call MD for:  severe uncontrolled pain     Call MD for:  difficulty breathing, headache or visual disturbances     Call MD for:  redness, tenderness, or signs of infection (pain, swelling, redness, odor or green/yellow discharge around incision site)     Call MD for:  persistent dizziness or light-headedness     Call MD for:  extreme fatigue     Call MD for:   Order Comments: heavy vaginal bleeding     Follow-up Information     [facility]. Schedule an appointment as soon as possible for a visit in 1 week.    Specialty: Obstetrics and Gynecology  Why: post op visit  Contact information:  [address] [place] [address] [phone]           Rebecca Christiansen MD. Schedule an appointment as soon as possible for a visit in 1 week.    Specialty: Obstetrics and Gynecology  Why: post op visit/resident clinic with Dr. Christiansen   Contact information:  [address] [facility] [address]  [phone]                     Payton B. Olson M.D. [id number]

## 2020-09-09 NOTE — ANESTHESIA POSTPROCEDURE EVALUATION
Anesthesia Post Evaluation    Patient: Gilmer Sarmiento    Procedure(s) Performed: Procedure(s) (LRB):  LAPAROSCOPY, DIAGNOSTIC (N/A)  INSERTION, INTRAUTERINE DEVICE (N/A)  SALPINGECTOMY (Right)    Final Anesthesia Type: general    Patient location during evaluation: PACU  Patient participation: Yes- Able to Participate  Level of consciousness: awake and alert  Post-procedure vital signs: reviewed and stable  Pain management: adequate  Airway patency: patent    PONV status at discharge: No PONV  Anesthetic complications: no      Cardiovascular status: blood pressure returned to baseline  Respiratory status: unassisted and room air  Hydration status: euvolemic  Follow-up not needed.          Vitals Value Taken Time   /68 09/08/20 2345   Temp 36.4 °C (97.6 °F) 09/08/20 2345   Pulse 67 09/08/20 2345   Resp 18 09/08/20 2345   SpO2 96 % 09/08/20 2345         Event Time   Out of Recovery 23:50:12         Pain/Meghan Score: Pain Rating Prior to Med Admin: 7 (9/8/2020 11:20 PM)  Pain Rating Post Med Admin: 7 (9/8/2020 11:20 PM)  Meghan Score: 9 (9/8/2020 11:49 PM)

## 2020-09-09 NOTE — SUBJECTIVE & OBJECTIVE
OB History    Para Term  AB Living   5 2 0 0 2 0   SAB TAB Ectopic Multiple Live Births   0 0 0 0 0      # Outcome Date GA Lbr Benedict/2nd Weight Sex Delivery Anes PTL Lv   5             4 AB            3 AB            2 Para            1 Para              History reviewed. No pertinent past medical history.  Past Surgical History:   Procedure Laterality Date     SECTION      x3       (Not in a hospital admission)      Review of patient's allergies indicates:  No Known Allergies     Family History     None        Tobacco Use    Smoking status: Never Smoker    Smokeless tobacco: Never Used   Substance and Sexual Activity    Alcohol use: Not Currently    Drug use: Never    Sexual activity: Yes     Review of Systems   Constitutional: Negative for chills and fever.   Eyes: Negative for visual disturbance.   Respiratory: Negative for shortness of breath.    Cardiovascular: Negative for chest pain and palpitations.   Gastrointestinal: Negative for abdominal pain, constipation, diarrhea, nausea and vomiting.   Genitourinary: Negative for vaginal bleeding and vaginal discharge.   Musculoskeletal: Negative for back pain.   Integumentary:  Negative for rash.   Neurological: Negative for seizures, syncope and headaches.   Hematological: Does not bruise/bleed easily.   Psychiatric/Behavioral: Negative for depression. The patient is not nervous/anxious.       Objective:     Vital Signs (Most Recent):  Temp: 98.5 °F (36.9 °C) (20)  Pulse: 69 (20)  Resp: 16 (20)  BP: 107/72 (20)  SpO2: 99 % (20) Vital Signs (24h Range):  Temp:  [98.4 °F (36.9 °C)-98.7 °F (37.1 °C)] 98.5 °F (36.9 °C)  Pulse:  [69-97] 69  Resp:  [16-20] 16  SpO2:  [97 %-100 %] 99 %  BP: (107-123)/(72-84) 107/72        There is no height or weight on file to calculate BMI.    No LMP recorded.    Physical Exam:   Constitutional: She is oriented to person, place, and time. She  appears well-developed and well-nourished. No distress.    HENT:   Head: Normocephalic and atraumatic.   Nose: No epistaxis.    Eyes: Conjunctivae and EOM are normal.    Neck: Normal range of motion. Neck supple. No thyromegaly present.    Cardiovascular: Normal rate and regular rhythm.     Pulmonary/Chest: Effort normal. No respiratory distress.        Abdominal: Soft. She exhibits no distension. There is no abdominal tenderness.   Mild abdominal pain to palpation      Genitourinary:    Genitourinary Comments: Patient deferred after speaking about having surgery              Musculoskeletal: Normal range of motion and moves all extremeties. No tenderness or edema.       Neurological: She is alert and oriented to person, place, and time.    Skin: Skin is warm and dry. No rash noted.    Psychiatric: She has a normal mood and affect. Her behavior is normal. Thought content normal.       Laboratory:  Beta HCG: No results for input(s): HCGPREGUR in the last 48 hours.  CBC:   Recent Labs   Lab 09/08/20  1538   WBC 4.90   RBC 4.37   HGB 11.5*   HCT 36.0*      MCV 82   MCH 26.3*   MCHC 31.9*     CMP:   Recent Labs   Lab 09/08/20  1538   GLU 94   CALCIUM 8.9   ALBUMIN 4.1   PROT 7.4      K 4.0   CO2 26      BUN 12   CREATININE 0.7   ALKPHOS 42   ALT 26   AST 26   BILITOT 0.5     Urine Pregnancy Test: No results for input(s): PREGTESTUR in the last 48 hours.    Diagnostic Results:  EXAMINATION:  US OB <14 WEEKS, TRANSABDOM & TRANSVAG, SINGLE GESTATION (XPD)     CLINICAL HISTORY:  vaginal bleeding in pregnancy;     TECHNIQUE:  Transabdominal sonography of the pelvis was performed, followed by transvaginal sonography to better evaluate the uterus and ovaries.     COMPARISON:  None.     FINDINGS:  Uterus: 9.3 x 5 x 7 cm.  The endometrium is overall normal in appearance and thickness measuring up to 10 mm.  A few small nabothian cysts noted.     No IUP seen.     Right ovary: 3.9 x 1.3 x 1.8 cm with intact  arterial and venous flow containing small follicles.  Along the peripheral aspect of the right ovary, there is a rounded hypoechoic mass with a mean diameter of 0.7 cm, which appears to contain a smaller ring-like structure measuring up to 3 mm suggestive of a yolk sac with a small linear echogenic focus suggestive of a fetal pole measuring 0.2 cm which also demonstrates cardiac activity with average 119 BPM, highly concerning for ectopic gestational sac with an estimated age of 5 weeks and 2 days by mean sac diameter and 5 weeks and 5 days by crown-rump length.     Left ovary: 3.7 x 1.8 x 3 cm with intact arterial and venous flow containing a 1.3 cm probable corpus luteum     Miscellaneous: No Free Fluid.     Impression:     No IUP seen.     Sonographic findings highly concerning for right para ovarian/peripheral ovarian living ectopic pregnancy with an average gestational age of 5 weeks and 3 days, as detailed above.  No evidence of rupture at this time.     COMMUNICATION  This critical result was discovered/received at 17:53 hours on 09/08/2020.  The critical information above was relayed directly by me by telephone to physician assistant Beatriz Lucia on 09/08/2020 at 18:00 hours.     This report was flagged in Epic as abnormal.        Electronically signed by: Rolando Mera MD

## 2020-09-09 NOTE — TRANSFER OF CARE
Anesthesia Transfer of Care Note    Patient: Gilmer Sarmiento    Procedure(s) Performed: Procedure(s) (LRB):  LAPAROSCOPY, DIAGNOSTIC (N/A)  INSERTION, INTRAUTERINE DEVICE (N/A)    Patient location: PACU    Anesthesia Type: general    Transport from OR: Transported from OR on 2-3 L/min O2 by NC with adequate spontaneous ventilation    Post pain: adequate analgesia    Post assessment: no apparent anesthetic complications    Post vital signs: stable    Level of consciousness: awake and alert    Nausea/Vomiting: no nausea/vomiting    Complications: none    Transfer of care protocol was followed      Last vitals:   Visit Vitals  /72   Pulse 69   Temp 36.9 °C (98.5 °F) (Oral)   Resp 16   SpO2 99%   Breastfeeding No

## 2020-09-09 NOTE — ASSESSMENT & PLAN NOTE
- TVUS reviewed, ectopic pregnancy with beta HCG>7000  - discussed options with patient, recommend surgery. She is in agreement  - Surgery and blood consents signed  - A pos  - H/H 11/36, stable  - VSS  - no free fluid on imaging, no signs of rupture on exam  - unplanned pregnancy, interested in IUD, will place at time of surgery   - will f/u in [facility] clinic

## 2020-09-09 NOTE — ED PROVIDER NOTES
Encounter Date: 2020    SCRIBE #1 NOTE: IJuan, am scribing for, and in the presence of, Dr. Alonzo.       History     Chief Complaint   Patient presents with    Vaginal Bleeding     pt came from [facility] for vag bleeding and US ectopic     Time seen by provider: 8:53 PM    This is a 29 y.o. female at 5.5 weeks gestation, A2, who presents via EMS as a transfer from [facility] with complaint of vaginal bleeding and a US confirmed ectopic pregnancy. Patient reports minor stomach pains for the past two days. Patient denies dysuria. Last normal menstrual cycle was at the beginning of 2020.       The history is provided by the patient.     Review of patient's allergies indicates:  No Known Allergies  History reviewed. No pertinent past medical history.  Past Surgical History:   Procedure Laterality Date     SECTION      x3     History reviewed. No pertinent family history.  Social History     Tobacco Use    Smoking status: Never Smoker    Smokeless tobacco: Never Used   Substance Use Topics    Alcohol use: Not Currently    Drug use: Never     Review of Systems   Constitutional: Negative for fever.   HENT: Negative for sore throat.    Respiratory: Negative for shortness of breath.    Cardiovascular: Negative for chest pain.   Gastrointestinal: Positive for abdominal pain. Negative for nausea.   Genitourinary: Positive for vaginal bleeding. Negative for dysuria.   Musculoskeletal: Negative for back pain.   Skin: Negative for rash.   Neurological: Negative for weakness.   Hematological: Does not bruise/bleed easily.       Physical Exam     Initial Vitals [20]   BP Pulse Resp Temp SpO2   107/72 69 16 98.5 °F (36.9 °C) 99 %      MAP       --         Physical Exam    Nursing note and vitals reviewed.  Constitutional: She appears well-developed and well-nourished. She is not diaphoretic. No distress.   HENT:   Head: Normocephalic and atraumatic.   Nose: Nose normal.   Eyes: Conjunctivae  and EOM are normal. Pupils are equal, round, and reactive to light. Right eye exhibits no discharge. Left eye exhibits no discharge. No scleral icterus.   Neck: Normal range of motion.   Cardiovascular: Normal rate, regular rhythm, normal heart sounds and intact distal pulses. Exam reveals no gallop and no friction rub.    No murmur heard.  Peripheral perfusion appropriate   Pulmonary/Chest: Breath sounds normal. No stridor. No respiratory distress. She has no wheezes. She has no rhonchi. She has no rales. She exhibits no tenderness.   Abdominal: There is no abdominal tenderness. There is no rebound and no guarding.   Uterus not palpable above pelvic brim.    Musculoskeletal: Normal range of motion.      Comments: Gait normal   Neurological: She is alert and oriented to person, place, and time. She has normal strength.   Skin: Skin is warm and dry. No rash noted. No pallor.   Psychiatric: She has a normal mood and affect.         ED Course   Procedures  Labs Reviewed   POCT GLUCOSE MONITORING CONTINUOUS          Imaging Results    None          Medical Decision Making:   History:   Old Medical Records: I decided to obtain old medical records.  Clinical Tests:   Lab Tests: Reviewed and Ordered            Scribe Attestation:   Scribe #1: I performed the above scribed service and the documentation accurately describes the services I performed. I attest to the accuracy of the note.    Attending Attestation:           Physician Attestation for Scribe:  Physician Attestation Statement for Scribe #1: I, Dr. Alonzo, reviewed documentation, as scribed by Juan York in my presence, and it is both accurate and complete.                    Healthy young female, G6, P5 5, 2 elective ABs, no history of tubal pathology, is transferred from outside facility where she presented for vaginal bleeding and minimal abdominal pain, workup revealed approximately 5 week ectopic pregnancy with cardiac activity.  No evidence of  rupture/hemoperitoneum.  Upon arrival she is comfortable appearing, hemodynamically stable, with a benign abdomen.  Gyn was consulted, evaluated the patient and decision was made to bring the patient for operative intervention.       Clinical Impression:     1. Ectopic pregnancy, unspecified location, unspecified whether intrauterine pregnancy present    2. Right tubal pregnancy without intrauterine pregnancy                ED Disposition Condition    Observation                             Trey Alonzo II, MD  09/08/20 8535

## 2020-09-09 NOTE — TELEPHONE ENCOUNTER
Patient reports having an emergency surgery last night in which she discussed IUD placement with the surgeon pre-procedure. Patient wants to confirm that the IUD was placed.     1902 Secure chat sent to Dr. Jeny Briggs. Per Dr. Briggs, will plan to f/u with the patient.       Reason for Disposition   Lab result questions   [1] Follow-up call from patient regarding patient's clinical status AND [2] information NON-URGENT    Additional Information   Negative: [1] Caller is not with the adult (patient) AND [2] reporting urgent symptoms   Negative: ED call to PCP   Negative: Physician call to PCP   Negative: Lab or radiology calling with CRITICAL test results   Negative: [1] Prescription not at pharmacy AND [2] was prescribed today by PCP   Negative: [1] Follow-up call from patient regarding patient's clinical status AND [2] information urgent   Negative: [1] Caller requests to speak ONLY to PCP AND [2] URGENT question   Negative: [1] Caller requests to speak to PCP now AND [2] won't tell us reason for call  (Exception: if 10 pm to 6 am, caller must first discuss reason for the call)   Negative: Notification of hospital admission   Negative: Notification of death   Negative: Call about patient who is currently hospitalized   Negative: Lab or radiology calling with test results   Negative: Caller requesting lab results    Protocols used: INFORMATION ONLY CALL-A-, PCP CALL - NO TRIAGE-A-

## 2020-09-09 NOTE — HPI
Gilmer Sarmiento is a 29 y.o. [id number] at roughly 6wk GA presented to the ED complaining of 2 days of vaginal bleeding. LMP last week of July per patient. Regular cycles, unplanned pregnancy. She states she has been having some abdominal cramping but denies any severe abdominal pain. Denies nausea/vomting. She was planning to see her OBGYN to establish care tomorrow.     PMH: none  PSH: LTCS x 3 (last done in )  Ob Hx: LTCS at term x 3, 2 TAB.   Gyn: regular cycles, denies AUB. Denies h/x of STDs. Unplanned pregnancy, was not on contraception. Denies abnormal paps.

## 2020-09-09 NOTE — ANESTHESIA PREPROCEDURE EVALUATION
09/08/2020  Gilmer Sarmiento is a 29 y.o., female.    Anesthesia Evaluation    I have reviewed the Patient Summary Reports.    I have reviewed the Nursing Notes.    I have reviewed the Medications.     Review of Systems  Anesthesia Hx:  No problems with previous Anesthesia C/S 11 years ago- not sure if GA or epidural Denies Family Hx of Anesthesia complications.   Denies Personal Hx of Anesthesia complications.   Social:  Non-Smoker    Hematology/Oncology:  Hematology Normal   Oncology Normal     EENT/Dental:EENT/Dental Normal   Cardiovascular:  Cardiovascular Normal     Pulmonary:  Pulmonary Normal    Renal/:  Renal/ Normal     Hepatic/GI:  Hepatic/GI Normal    Musculoskeletal:  Musculoskeletal Normal    OB/GYN/PEDS:  Ectopic pregnancy   Neurological:  Neurology Normal    Endocrine:  Endocrine Normal    Dermatological:  Skin Normal    Psych:  Psychiatric Normal           Physical Exam  General:  Well nourished    Airway/Jaw/Neck:  Airway Findings: Mouth Opening: Normal Mallampati: II      Dental:  Dental Findings: In tact        Mental Status:  Mental Status Findings:  Cooperative, Alert and Oriented         Anesthesia Plan  Type of Anesthesia, risks & benefits discussed:  Anesthesia Type:  general  Patient's Preference:   Intra-op Monitoring Plan: standard ASA monitors  Intra-op Monitoring Plan Comments:   Post Op Pain Control Plan: multimodal analgesia  Post Op Pain Control Plan Comments:   Induction:   IV  Beta Blocker:         Informed Consent: Patient understands risks and agrees with Anesthesia plan.  Questions answered. Anesthesia consent signed with patient.  ASA Score: 2  emergent   Day of Surgery Review of History & Physical:    H&P update referred to the surgeon.         Ready For Surgery From Anesthesia Perspective.

## 2020-09-09 NOTE — H&P
[facility]  Obstetrics & Gynecology  History & Physical    Patient Name: Gilmer Sarmiento  MRN: [id number]  Admission Date: 2020  Primary Care Provider: Primary Doctor No    Subjective:     Chief Complaint/Reason for Admission: ectopic pregnancy     History of Present Illness:  Gilmer Sarmiento is a 29 y.o. [id number] at roughly 6wk GA presented to the ED complaining of 2 days of vaginal bleeding. LMP last week of July per patient. Regular cycles, unplanned pregnancy. She states she has been having some abdominal cramping but denies any severe abdominal pain. Denies nausea/vomting. She was planning to see her OBGYN to establish care tomorrow.     PMH: none  PSH: LTCS x 3 (last done in )  Ob Hx: LTCS at term x 3, 2 TAB.   Gyn: regular cycles, denies AUB. Denies h/x of STDs. Unplanned pregnancy, was not on contraception. Denies abnormal paps.         OB History    Para Term  AB Living   5 2 0 0 2 0   SAB TAB Ectopic Multiple Live Births   0 0 0 0 0      # Outcome Date GA Lbr Benedict/2nd Weight Sex Delivery Anes PTL Lv   5             4 AB            3 AB            2 Para            1 Para              History reviewed. No pertinent past medical history.  Past Surgical History:   Procedure Laterality Date     SECTION      x3       (Not in a hospital admission)      Review of patient's allergies indicates:  No Known Allergies     Family History     None        Tobacco Use    Smoking status: Never Smoker    Smokeless tobacco: Never Used   Substance and Sexual Activity    Alcohol use: Not Currently    Drug use: Never    Sexual activity: Yes     Review of Systems   Constitutional: Negative for chills and fever.   Eyes: Negative for visual disturbance.   Respiratory: Negative for shortness of breath.    Cardiovascular: Negative for chest pain and palpitations.   Gastrointestinal: Negative for abdominal pain, constipation, diarrhea, nausea and vomiting.   Genitourinary:  Negative for vaginal bleeding and vaginal discharge.   Musculoskeletal: Negative for back pain.   Integumentary:  Negative for rash.   Neurological: Negative for seizures, syncope and headaches.   Hematological: Does not bruise/bleed easily.   Psychiatric/Behavioral: Negative for depression. The patient is not nervous/anxious.       Objective:     Vital Signs (Most Recent):  Temp: 98.5 °F (36.9 °C) (09/08/20 1944)  Pulse: 69 (09/08/20 1944)  Resp: 16 (09/08/20 1944)  BP: 107/72 (09/08/20 1944)  SpO2: 99 % (09/08/20 1944) Vital Signs (24h Range):  Temp:  [98.4 °F (36.9 °C)-98.7 °F (37.1 °C)] 98.5 °F (36.9 °C)  Pulse:  [69-97] 69  Resp:  [16-20] 16  SpO2:  [97 %-100 %] 99 %  BP: (107-123)/(72-84) 107/72        There is no height or weight on file to calculate BMI.    No LMP recorded.    Physical Exam:   Constitutional: She is oriented to person, place, and time. She appears well-developed and well-nourished. No distress.    HENT:   Head: Normocephalic and atraumatic.   Nose: No epistaxis.    Eyes: Conjunctivae and EOM are normal.    Neck: Normal range of motion. Neck supple. No thyromegaly present.    Cardiovascular: Normal rate and regular rhythm.     Pulmonary/Chest: Effort normal. No respiratory distress.        Abdominal: Soft. She exhibits no distension. There is no abdominal tenderness.   Mild abdominal pain to palpation      Genitourinary:    Genitourinary Comments: Patient deferred after speaking about having surgery              Musculoskeletal: Normal range of motion and moves all extremeties. No tenderness or edema.       Neurological: She is alert and oriented to person, place, and time.    Skin: Skin is warm and dry. No rash noted.    Psychiatric: She has a normal mood and affect. Her behavior is normal. Thought content normal.       Laboratory:  Beta HCG: No results for input(s): HCGPREGUR in the last 48 hours.  CBC:   Recent Labs   Lab 09/08/20  1538   WBC 4.90   RBC 4.37   HGB 11.5*   HCT 36.0*   PLT  315   MCV 82   MCH 26.3*   MCHC 31.9*     CMP:   Recent Labs   Lab 09/08/20  1538   GLU 94   CALCIUM 8.9   ALBUMIN 4.1   PROT 7.4      K 4.0   CO2 26      BUN 12   CREATININE 0.7   ALKPHOS 42   ALT 26   AST 26   BILITOT 0.5     Urine Pregnancy Test: No results for input(s): PREGTESTUR in the last 48 hours.    Diagnostic Results:  EXAMINATION:  US OB <14 WEEKS, TRANSABDOM & TRANSVAG, SINGLE GESTATION (XPD)     CLINICAL HISTORY:  vaginal bleeding in pregnancy;     TECHNIQUE:  Transabdominal sonography of the pelvis was performed, followed by transvaginal sonography to better evaluate the uterus and ovaries.     COMPARISON:  None.     FINDINGS:  Uterus: 9.3 x 5 x 7 cm.  The endometrium is overall normal in appearance and thickness measuring up to 10 mm.  A few small nabothian cysts noted.     No IUP seen.     Right ovary: 3.9 x 1.3 x 1.8 cm with intact arterial and venous flow containing small follicles.  Along the peripheral aspect of the right ovary, there is a rounded hypoechoic mass with a mean diameter of 0.7 cm, which appears to contain a smaller ring-like structure measuring up to 3 mm suggestive of a yolk sac with a small linear echogenic focus suggestive of a fetal pole measuring 0.2 cm which also demonstrates cardiac activity with average 119 BPM, highly concerning for ectopic gestational sac with an estimated age of 5 weeks and 2 days by mean sac diameter and 5 weeks and 5 days by crown-rump length.     Left ovary: 3.7 x 1.8 x 3 cm with intact arterial and venous flow containing a 1.3 cm probable corpus luteum     Miscellaneous: No Free Fluid.     Impression:     No IUP seen.     Sonographic findings highly concerning for right para ovarian/peripheral ovarian living ectopic pregnancy with an average gestational age of 5 weeks and 3 days, as detailed above.  No evidence of rupture at this time.     COMMUNICATION  This critical result was discovered/received at 17:53 hours on 09/08/2020.  The  critical information above was relayed directly by me by telephone to physician assistant Beatriz Lucia on 09/08/2020 at 18:00 hours.     This report was flagged in Epic as abnormal.        Electronically signed by: Rolando Mera MD    Assessment/Plan:     Ectopic pregnancy  - TVUS reviewed, ectopic pregnancy with beta HCG>7000  - discussed options with patient, recommend surgery. She is in agreement  - Surgery and blood consents signed  - A pos  - H/H 11/36, stable  - VSS  - no free fluid on imaging, no signs of rupture on exam  - unplanned pregnancy, interested in IUD, will place at time of surgery   - will f/u in [facility] clinic         Jeny Briggs MD  Obstetrics & Gynecology  [facility]

## 2020-09-09 NOTE — ANESTHESIA PROCEDURE NOTES
Intubation  Performed by: Viktoria Beltre CRNA  Authorized by: Lyle Cyr MD     Intubation:     Induction:  Intravenous    Intubated:  Postinduction    Mask Ventilation:  Easy mask    Attempts:  1    Attempted By:  CRNA    Method of Intubation:  Video laryngoscopy    Blade:  Chaparro 3    Laryngeal View Grade: Grade I - full view of chords      Difficult Airway Encountered?: No      Complications:  None    Airway Device:  Oral endotracheal tube    Airway Device Size:  7.0    Style/Cuff Inflation:  Cuffed    Tube secured:  21    Secured at:  The lips    Placement Verified By:  Capnometry    Complicating Factors:  None    Findings Post-Intubation:  BS equal bilateral and atraumatic/condition of teeth unchanged

## 2020-09-09 NOTE — OP NOTE
OPERATIVE REPORT    Date of procedure: 09/08/2020    PREOPERATIVE DIAGNOSIS  1. Right Ectopic Pregnancy     POSTOPERATIVE DIAGNOSIS  same    PROCEDURE:  1. Diagnostic laparoscopy  2. Right salpingectomy     SURGEON: Dr. Ruthann Masters MD    ASSISTANT: Jney Briggs MD PGY-4    ANESTHESIA: General    COMPLICATIONS: None    EBL: 5 cc    IV FLUIDS: 1000 cc    URINE OUTPUT: 25 cc    Findings/Key Components:   1. Uterus gently sounded to 7cm  2. Grossly normal uterus, left fallopian tube and ovary, and right ovary. Ectopic pregnancy noted in right fallopian tube. No adhesive disease noted  3. Right salpingectomy performed without difficulty   4. Mirena IUD placed at the conclusion of the case     PROCEDURE:   Patient was taking to the operating room where general anesthesia was administered and found to be adequate.  She was prepped and draped in the dorsal lithotomy position.  A miranda was placed. A weighted sterile speculum was placed in the vagina.  The anterior lip of the cervix was grasped with a single tooth tenaculum.  The uterus was sounded to approximately 7 cm.  A BASIM uterine manipulator was placed in the endometrial cavity.    Gloves were changed.  A Veress needle was inserted into the umbilicus under tenting of the anterior abdominal wall.  Placement into the peritoneal cavity was confirmed via saline drop test.  The abdomen was insufflated to 15mm Hg using Carbon dioxide.  A 5 mm supraumbilical skin incision was made with the scalpel.  A 5mm optiview trocar was advanced through this incision.  Excellent hemostasis was noted.  The patient was placed in deep Trendelenburg.  An 5 mm left lateral skin incision was made with a scalpel and a 5 mm trocar was advanced through this incision under visualization of the camera.  A 5 mm right lateral skin incision was made with the scalpel.  A 5 mm trocar was advanced through this incision under visualization of the camera.  Excellent hemostasis was noted.      Attention  was turned to the pelvis. Evidence of right ectopic pregnancy in right fallopian tube noted. Abdominal survey benign, no evidence of adhesive disease. Right fallopian tube was removed using the ligasure device.     Excellent hemostasis was noted.      Attention was turned to the anterior abdominal wall. The abdomen was deflated and all trocars were removed.  The skin was closed with 4-0 Monocryl in a subcuticular fashion.  Sponge, lap, and needle counts were correct x 2. The miranda and the BASIM were removed. The Cervix was grasped with a tenaculum and the mirena IUD was placed. The strings were trimmed. The patient was taken to the recovery room in stable condition.    Jeny Briggs M.D.  Obstetrics and Gynecology   PGY-4

## 2020-09-09 NOTE — ED TRIAGE NOTES
Pt came to the ed tonight c.o. ectopic pregnancy. Pt was dx with this today after experiencing vaginal bleeding and a positive pregnancy. Pt is aaox4 and is in no acute distress at this time. Pt has some mild lower abdominal pain. Pt will be monitored at this time.

## 2020-09-15 LAB
FINAL PATHOLOGIC DIAGNOSIS: NORMAL
GROSS: NORMAL

## 2020-09-16 ENCOUNTER — OFFICE VISIT (OUTPATIENT)
Dept: OBSTETRICS AND GYNECOLOGY | Facility: CLINIC | Age: 30
End: 2020-09-16
Payer: MEDICAID

## 2020-09-16 VITALS
SYSTOLIC BLOOD PRESSURE: 116 MMHG | WEIGHT: 170.44 LBS | BODY MASS INDEX: 27.39 KG/M2 | DIASTOLIC BLOOD PRESSURE: 82 MMHG | HEIGHT: 66 IN

## 2020-09-16 DIAGNOSIS — Z30.431 ENCOUNTER FOR ROUTINE CHECKING OF INTRAUTERINE CONTRACEPTIVE DEVICE (IUD): ICD-10-CM

## 2020-09-16 DIAGNOSIS — Z09 POSTOPERATIVE EXAMINATION: Primary | ICD-10-CM

## 2020-09-16 DIAGNOSIS — O00.101 RIGHT TUBAL PREGNANCY WITHOUT INTRAUTERINE PREGNANCY: ICD-10-CM

## 2020-09-16 PROCEDURE — 99999 PR PBB SHADOW E&M-EST. PATIENT-LVL III: ICD-10-PCS | Mod: PBBFAC,,, | Performed by: OBSTETRICS & GYNECOLOGY

## 2020-09-16 PROCEDURE — 99213 OFFICE O/P EST LOW 20 MIN: CPT | Mod: PBBFAC,TH,PO | Performed by: OBSTETRICS & GYNECOLOGY

## 2020-09-16 PROCEDURE — 99024 POSTOP FOLLOW-UP VISIT: CPT | Mod: S$PBB,TH,, | Performed by: OBSTETRICS & GYNECOLOGY

## 2020-09-16 PROCEDURE — 99999 PR PBB SHADOW E&M-EST. PATIENT-LVL III: CPT | Mod: PBBFAC,,, | Performed by: OBSTETRICS & GYNECOLOGY

## 2020-09-16 PROCEDURE — 99024 PR POST-OP FOLLOW-UP VISIT: ICD-10-PCS | Mod: S$PBB,TH,, | Performed by: OBSTETRICS & GYNECOLOGY

## 2020-09-16 NOTE — PROGRESS NOTES
"  Reason for visit: Post-op Evaluation    HPI:    30 y.o. female     Patient's last menstrual period was 2020 (approximate).     [facility] right salpingectomy for ectopic pregnancy on 2020   Final path -- "Fallopian tube with chorionic villi and clots (tubal pregnancy)."   Mirena placed following surgery   Mild right sided pain with movement.  Overall, pain controlled.   No N/V, constipation, or diarrhea.  +bowel function.  Tolerating PO.   Some spotting since surgery      Contraception: Mirena, 2020  Pap: No result found, No recent documented pap  Mammogram: N/A      Past medical, surgical, social, family, and obstetric history: Reviewed and updated in EMR.  Medications: Reviewed and updated in EMR.  Allergies: Patient has no known allergies.       Review of Systems   Constitutional: Negative for fever.   Respiratory: Negative for shortness of breath.    Cardiovascular: Negative for chest pain.   Gastrointestinal: Positive for abdominal pain. Negative for nausea and vomiting.   Genitourinary: Positive for vaginal bleeding.   Neurological: Negative for headaches.         Vitals: /82   Ht 5' 6" (1.676 m)   Wt 77.3 kg (170 lb 6.7 oz)   LMP 2020 (Approximate)   BMI 27.51 kg/m²     Physical Exam  Constitutional:       General: She is not in acute distress.     Appearance: Normal appearance. She is well-developed.   Neck:      Musculoskeletal: Normal range of motion and neck supple.   Pulmonary:      Effort: Pulmonary effort is normal. No respiratory distress.   Abdominal:      General: A surgical scar is present. There is no distension.      Palpations: Abdomen is soft. There is no hepatomegaly, splenomegaly or mass.      Tenderness: There is no abdominal tenderness. There is no guarding or rebound.      Hernia: No hernia is present.      Comments: Incision: Clean, dry, and intact; no erythema, induration, or drainage   Musculoskeletal:      Right lower leg: No edema.      Left lower " leg: No edema.   Neurological:      Mental Status: She is alert and oriented to person, place, and time.   Skin:     Findings: No rash.   Psychiatric:         Mood and Affect: Mood and affect normal.           Assessment & Plan:    Postoperative examination    Right tubal pregnancy without intrauterine pregnancy    Encounter for routine checking of intrauterine contraceptive device (IUD)         Doing well   Final path confirms ectopic pregnancy   Incisions healing well   Counseled on bleeding pattern with initiation of Mirena   Follow-up in 4 weeks for WWE, IUD check, and UPT

## 2020-10-22 ENCOUNTER — NURSE TRIAGE (OUTPATIENT)
Dept: ADMINISTRATIVE | Facility: CLINIC | Age: 30
End: 2020-10-22

## 2020-10-22 NOTE — TELEPHONE ENCOUNTER
OCC Rn Patient called in reference to her yeast infection.  She is taking medicine and it is getting better but when is it going to go away.  She states she still has itching.  Care advice to continue medicine she received from ED and see doctor if no improvement in 3 days.  I offered to get her an appointment she declined.  I offered her ready/och anywhere care, she declined.  States she will wait one more day which will be day 3.  If still there will call us back or do anywhere care, gave her information or ready responders. Gave her information.  She verbalized understanding.     Reason for Disposition   Symptoms of a yeast infection' (i.e., itchy, white discharge, not bad smelling) and not improved > 3 days following Care Advice    Additional Information   Negative: Sounds like a life-threatening emergency to the triager   Negative: Has an IUD and abnormal vaginal discharge   Negative: Requests emergency contraception pills and NO vaginal symptoms   Negative: Pain or burning with urination is the main symptom   Negative: Followed an injury to the genital area   Negative: SEVERE (excruciating) vaginal or pelvic pain   Negative: Could be sexual abuse (Note: semen evidence may persist for 72 hours)   Negative: Child sounds very sick or weak to the triager   Negative: Pain or burning with passing urine (urination) is main symptom   Negative: Pregnant with vaginal discharge   Negative: SEVERE abdominal pain (e.g., excruciating)   Negative: Patient sounds very sick or weak to the triager   Negative: Yellow or green vaginal discharge and has a fever   Negative: Constant abdominal pain lasting > 2 hours   Negative: Patient wants to be seen   Negative: Rash (e.g., redness, tiny bumps, sore) of genital area present > 24 hours   Negative: Mild lower abdominal pain comes and goes (cramps) that lasts > 24 hours   Negative: Genital area looks infected (e.g., draining sore, spreading redness)   Negative:  Rash is tiny water blisters (3 or more)   Negative: Bad smelling vaginal discharge   Negative: Abnormal color vaginal discharge (i.e., yellow, green, gray)    Protocols used: VAGINAL [id number], VAGINAL SYMPTOMS OR DISCHARGE - AFTER [id number]

## 2025-03-05 ENCOUNTER — HOSPITAL ENCOUNTER (EMERGENCY)
Facility: HOSPITAL | Age: 35
Discharge: HOME | End: 2025-03-05
Attending: EMERGENCY MEDICINE
Payer: COMMERCIAL

## 2025-03-05 VITALS
HEIGHT: 66 IN | HEART RATE: 90 BPM | BODY MASS INDEX: 29.73 KG/M2 | RESPIRATION RATE: 17 BRPM | OXYGEN SATURATION: 100 % | DIASTOLIC BLOOD PRESSURE: 72 MMHG | WEIGHT: 185 LBS | TEMPERATURE: 99 F | SYSTOLIC BLOOD PRESSURE: 138 MMHG

## 2025-03-05 DIAGNOSIS — Z20.2 POSSIBLE EXPOSURE TO STD: Primary | ICD-10-CM

## 2025-03-05 LAB
B-HCG UR QL: NEGATIVE
BACTERIA #/AREA URNS HPF: NORMAL /HPF
BACTERIA GENITAL QL WET PREP: ABNORMAL
BILIRUB UR QL STRIP: NEGATIVE
C TRACH DNA SPEC QL NAA+PROBE: NOT DETECTED
CLARITY UR: ABNORMAL
CLUE CELLS VAG QL WET PREP: ABNORMAL
COLOR UR: YELLOW
FILAMENT FUNGI VAG WET PREP-#/AREA: ABNORMAL
GLUCOSE UR QL STRIP: NEGATIVE
HCV AB SERPL QL IA: NEGATIVE
HEP C VIRUS HOLD SPECIMEN: NORMAL
HGB UR QL STRIP: NEGATIVE
HIV 1+2 AB+HIV1 P24 AG SERPL QL IA: NEGATIVE
KETONES UR QL STRIP: NEGATIVE
LEUKOCYTE ESTERASE UR QL STRIP: ABNORMAL
MICROSCOPIC COMMENT: NORMAL
N GONORRHOEA DNA SPEC QL NAA+PROBE: NOT DETECTED
NITRITE UR QL STRIP: NEGATIVE
PH UR STRIP: 7 [PH] (ref 5–8)
PROT UR QL STRIP: ABNORMAL
SP GR UR STRIP: 1.02 (ref 1–1.03)
SPECIMEN SOURCE: ABNORMAL
SQUAMOUS #/AREA URNS HPF: 39 /HPF
T VAGINALIS GENITAL QL WET PREP: ABNORMAL
URN SPEC COLLECT METH UR: ABNORMAL
UROBILINOGEN UR STRIP-ACNC: NEGATIVE EU/DL
WBC #/AREA URNS HPF: 4 /HPF (ref 0–5)
WBC #/AREA VAG WET PREP: ABNORMAL
YEAST GENITAL QL WET PREP: ABNORMAL

## 2025-03-05 PROCEDURE — 87210 SMEAR WET MOUNT SALINE/INK: CPT | Performed by: EMERGENCY MEDICINE

## 2025-03-05 PROCEDURE — 96372 THER/PROPH/DIAG INJ SC/IM: CPT | Performed by: EMERGENCY MEDICINE

## 2025-03-05 PROCEDURE — 81000 URINALYSIS NONAUTO W/SCOPE: CPT | Performed by: EMERGENCY MEDICINE

## 2025-03-05 PROCEDURE — 87389 HIV-1 AG W/HIV-1&-2 AB AG IA: CPT | Performed by: EMERGENCY MEDICINE

## 2025-03-05 PROCEDURE — 86803 HEPATITIS C AB TEST: CPT | Performed by: EMERGENCY MEDICINE

## 2025-03-05 PROCEDURE — 87491 CHLMYD TRACH DNA AMP PROBE: CPT | Performed by: EMERGENCY MEDICINE

## 2025-03-05 PROCEDURE — 63600175 PHARM REV CODE 636 W HCPCS: Mod: UD | Performed by: EMERGENCY MEDICINE

## 2025-03-05 PROCEDURE — 99284 EMERGENCY DEPT VISIT MOD MDM: CPT | Mod: 25

## 2025-03-05 PROCEDURE — 81025 URINE PREGNANCY TEST: CPT | Performed by: EMERGENCY MEDICINE

## 2025-03-05 RX ORDER — DOXYCYCLINE 100 MG/1
100 CAPSULE ORAL 2 TIMES DAILY
Qty: 14 CAPSULE | Refills: 0 | Status: SHIPPED | OUTPATIENT
Start: 2025-03-05 | End: 2025-03-12

## 2025-03-05 RX ORDER — METRONIDAZOLE 500 MG/1
500 TABLET ORAL 2 TIMES DAILY
Qty: 14 TABLET | Refills: 0 | Status: SHIPPED | OUTPATIENT
Start: 2025-03-05 | End: 2025-03-12

## 2025-03-05 RX ORDER — CEFTRIAXONE 500 MG/1
500 INJECTION, POWDER, FOR SOLUTION INTRAMUSCULAR; INTRAVENOUS
Status: COMPLETED | OUTPATIENT
Start: 2025-03-05 | End: 2025-03-05

## 2025-03-05 RX ADMIN — CEFTRIAXONE SODIUM 500 MG: 500 INJECTION, POWDER, FOR SOLUTION INTRAMUSCULAR; INTRAVENOUS at 06:03

## 2025-03-05 NOTE — ED PROVIDER NOTES
SCRIBE #1 NOTE: I, Jean Paul Rainey, am scribing for, and in the presence of, Gee Mccoy DO. I have scribed the entire note.       History     Chief Complaint   Patient presents with    Exposure to STD     Pt c/o burning with urination, vaginal discharge, frequency urination. Pt reports multiple partners lately. Denies fever, abd pain     Review of patient's allergies indicates:  No Known Allergies      History of Present Illness     HPI    3/5/2025, 4:48 AM  History obtained from the patient and medical records      History of Present Illness: Gilmer Sarmiento is a 34 y.o. female patient with a PMHx of ectopic pregnancy who presents to the Emergency Department for evaluation of STD exposure. Pt has been experiencing vaginal pain and dysuria with white discharge. Pt states that she has had unprotected sex recently. Symptoms are intermittent and moderate in severity. No mitigating or exacerbating factors reported. Pt also describes a recent sore which formed on the roof of her mouth. Patient denies any fever, hematuria, abdominal pain, rash, chills, or diarrhea. No prior Tx specified.  No further complaints or concerns at this time.       Arrival mode: Personal Transportation    PCP: [place], Primary Doctor        Past Medical History:  Past Medical History:   Diagnosis Date    History of ectopic pregnancy        Past Surgical History:  Past Surgical History:   Procedure Laterality Date     SECTION      x3    DIAGNOSTIC LAPAROSCOPY N/A 2020    Procedure: LAPAROSCOPY, DIAGNOSTIC;  Surgeon: Ruthann Masters MD;  Location: [facility];  Service: OB/GYN;  Laterality: N/A;    INTRAUTERINE DEVICE INSERTION N/A 2020    Procedure: INSERTION, INTRAUTERINE DEVICE;  Surgeon: Ruthann Masters MD;  Location: [facility];  Service: OB/GYN;  Laterality: N/A;    SALPINGECTOMY Right 2020    Procedure: SALPINGECTOMY;  Surgeon: Ruthann Masters MD;  Location: [facility];  Service: OB/GYN;  Laterality: Right;      "    Family History:  No family history on file.    Social History:  Social History     Tobacco Use    Smoking status: Never    Smokeless tobacco: Never   Substance and Sexual Activity    Alcohol use: Not Currently    Drug use: Never    Sexual activity: Yes        Review of Systems     Review of Systems   Constitutional:  Negative for chills and fever.   Gastrointestinal:  Negative for abdominal pain and diarrhea.   Genitourinary:  Positive for dysuria, vaginal discharge and vaginal pain. Negative for hematuria.        Physical Exam     Initial Vitals [03/05/25 0420]   BP Pulse Resp Temp SpO2   128/84 88 20 98.6 °F (37 °C) 98 %      MAP       --          Physical Exam  Constitutional:       Appearance: Normal appearance.   Genitourinary:     Comments: Vaginal exam shows no noticeable abnormalities  Skin:     General: Skin is warm and dry.      Findings: No rash.   Neurological:      General: No focal deficit present.      Mental Status: She is alert and oriented to person, place, and time.            ED Course   Procedures  ED Vital Signs:  Vitals:    03/05/25 0420 03/05/25 0556   BP: 128/84 138/72   Pulse: 88 90   Resp: 20 17   Temp: 98.6 °F (37 °C)    SpO2: 98% 100%   Weight: 83.9 kg (185 lb)    Height: 5' 6" (1.676 m)        Abnormal Lab Results:  Labs Reviewed   VAGINAL SCREEN - Abnormal       Result Value    Trichomonas None      Clue Cells Few (*)     Budding Yeast None      Fungal Hyphae None      WBC - Vaginal Screen None      Bacteria - Vaginal Screen Occasional (*)     Wet Prep Source Vagina      Narrative:     Release to patient->Immediate   URINALYSIS, REFLEX TO URINE CULTURE - Abnormal    Specimen UA Urine, Clean Catch      Color, UA Yellow      Appearance, UA Hazy (*)     pH, UA 7.0      Specific Gravity, UA 1.020      Protein, UA Trace (*)     Glucose, UA Negative      Ketones, UA Negative      Bilirubin (UA) Negative      Occult Blood UA Negative      Nitrite, UA Negative      Urobilinogen, UA " Negative      Leukocytes, UA 1+ (*)     Narrative:     Specimen Source->Urine   HEPATITIS C ANTIBODY    Hepatitis C Ab Negative      Narrative:     Release to patient->Immediate   HEP C VIRUS HOLD SPECIMEN    HEP C Virus Hold Specimen Hold for HCV sendout      Narrative:     Release to patient->Immediate   HIV 1 / 2 ANTIBODY    HIV 1/2 Ag/Ab Negative      Narrative:     Release to patient->Immediate   PREGNANCY TEST, URINE RAPID    Preg Test, Ur Negative      Narrative:     Specimen Source->Urine   C. TRACHOMATIS/N. GONORRHOEAE BY AMP DNA    Chlamydia, Amplified DNA Not Detected      N gonorrhoeae, amplified DNA Not Detected      Narrative:     Sources by Resulting Lab:->[facility]  Release to patient->Immediate   URINALYSIS MICROSCOPIC    WBC, UA 4      Bacteria Rare      Squam Epithel, UA 39      Microscopic Comment SEE COMMENT      Narrative:     Specimen Source->Urine        All Lab Results:  Results for orders placed or performed during the hospital encounter of 03/05/25   Urinalysis, Reflex to Urine Culture Urine, Clean Catch    Collection Time: 03/05/25  4:28 AM    Specimen: Urine, Clean Catch   Result Value Ref Range    Specimen UA Urine, Clean Catch     Color, UA Yellow Yellow, Straw, [place]    Appearance, UA Hazy (A) Clear    pH, UA 7.0 5.0 - 8.0    Specific Gravity, UA 1.020 1.005 - 1.030    Protein, UA Trace (A) Negative    Glucose, UA Negative Negative    Ketones, UA Negative Negative    Bilirubin (UA) Negative Negative    Occult Blood UA Negative Negative    Nitrite, UA Negative Negative    Urobilinogen, UA Negative <2.0 EU/dL    Leukocytes, UA 1+ (A) Negative   Pregnancy, urine rapid    Collection Time: 03/05/25  4:28 AM   Result Value Ref Range    Preg Test, Ur Negative    Urinalysis Microscopic    Collection Time: 03/05/25  4:28 AM   Result Value Ref Range    WBC, UA 4 0 - 5 /hpf    Bacteria Rare None-Occ /hpf    Squam Epithel, UA 39 /hpf    Microscopic Comment SEE COMMENT    Vaginal Screen     Collection Time: 03/05/25  5:01 AM    Specimen: Vagina; Genital   Result Value Ref Range    Trichomonas None None    Clue Cells Few (A) None    Budding Yeast None None    Fungal Hyphae None None    WBC - Vaginal Screen None None    Bacteria - Vaginal Screen Occasional (A) None    Wet Prep Source Vagina    C. trachomatis/N. gonorrhoeae by AMP DNA [facility]; Vagina    Collection Time: 03/05/25  5:01 AM   Result Value Ref Range    Chlamydia, Amplified DNA Not Detected Not Detected    N gonorrhoeae, amplified DNA Not Detected Not Detected   Hepatitis C Antibody    Collection Time: 03/05/25  5:08 AM   Result Value Ref Range    Hepatitis C Ab Negative Negative   HCV Virus Hold Specimen    Collection Time: 03/05/25  5:08 AM   Result Value Ref Range    HEP C Virus Hold Specimen Hold for HCV sendout    HIV 1/2 Ag/Ab (4th Gen)    Collection Time: 03/05/25  5:08 AM   Result Value Ref Range    HIV 1/2 Ag/Ab Negative Negative       Imaging Results:  Imaging Results    None                     The Emergency Provider reviewed the vital signs and test results, which are outlined above.     ED Discussion     6:13 AM: Reassessed pt at this time. Discussed with patient and/or family/caretaker all pertinent ED information and results. Discussed pt dx and plan of tx. Gave the patient all f/u and return to the ED instructions. All questions and concerns were addressed at this time. Patient and/or family/caretaker expresses understanding of information and instructions, and is comfortable with plan to discharge. Pt is stable for discharge.     I discussed with patient and/or family/caretaker that evaluation in the ED does not suggest any emergent or life threatening medical conditions requiring immediate intervention beyond what was provided in the ED, and I believe patient is safe for discharge.  Regardless, an unremarkable evaluation in the ED does not preclude the development or presence of a serious of life threatening condition. As  such, I instructed that the patient is to return immediately for any worsening or change in current symptoms.        ED Course as of 03/06/25 1219   Wed Mar 05, 2025   0613 HIV 1/2 Ag/Ab (4th Gen)  Negative [CD]   0613 C. trachomatis/N. gonorrhoeae by AMP DNA [facility]; Vagina  Pending [CD]   0613 Urinalysis Microscopic  Nonspecific [CD]   0613 Pregnancy, urine rapid  Negative [CD]   0613 Urinalysis, Reflex to Urine Culture Urine, Clean Catch(!)  Nonspecific [CD]   0613 Vaginal Screen(!)  Clue cells suspicious for bacterial vaginosis [CD]   0613 Hepatitis C Antibody  Negative [CD]      ED Course User Index  [CD] Gee Mccoy, DO     Medical Decision Making  Patient instructed to return immediately for any new or worsening symptoms and she verbalized understanding.    Amount and/or Complexity of Data Reviewed  Labs: ordered. Decision-making details documented in ED Course.    Risk  Prescription drug management.  Risk Details: Differential diagnosis includes but is not limited to:  UTI, sexually transmitted disease                ED Medication(s):  Medications   cefTRIAXone injection 500 mg (500 mg Intramuscular Given 3/5/25 0624)       Discharge Medication List as of 3/5/2025  6:13 AM        START taking these medications    Details   doxycycline (VIBRAMYCIN) 100 MG Cap Take 1 capsule (100 mg total) by mouth 2 (two) times daily. for 7 days, Starting Wed 3/5/2025, Until Wed 3/12/2025, Print      metroNIDAZOLE (FLAGYL) 500 MG tablet Take 1 tablet (500 mg total) by mouth 2 (two) times a day. for 7 days, Starting Wed 3/5/2025, Until Wed 3/12/2025, Print              Follow-up Information       Schedule an appointment as soon as possible for a visit  with [place] [place].    Contact information:  [address]  [place] [address] [phone]                                 Scribe Attestation:   Scribe #1: I performed the above scribed service and the documentation accurately describes the  services I performed. I attest to the accuracy of the note.     Attending:   Physician Attestation Statement for Scribe #1: I, Gee Mccoy DO, personally performed the services described in this documentation, as scribed by Jean Paul Rainey, in my presence, and it is both accurate and complete.           Clinical Impression       ICD-10-CM ICD-9-CM   1. Possible exposure to STD  Z20.2 V01.6       Disposition:   Disposition: Discharged  Condition: Stable        Gee Mccoy DO  03/06/25 1221

## (undated) DEVICE — PACK LAPAROSCOPY BAPTIST

## (undated) DEVICE — DRESSING LEUKOPLAST FLEX 1X3IN

## (undated) DEVICE — JELLY SURGILUBE 5GR

## (undated) DEVICE — BAG TISSUE RETRIEVAL 5MM

## (undated) DEVICE — SOL NS 1000CC

## (undated) DEVICE — KIT WING PAD POSITIONING

## (undated) DEVICE — NDL INSUF ULTRA VERESS 120MM

## (undated) DEVICE — PAD ABD 8X10 STERILE

## (undated) DEVICE — ELECTRODE NEEDLE 1IN

## (undated) DEVICE — SOL 9P NACL IRR PIC IL

## (undated) DEVICE — PENCIL ELECTROSURG HOLST W/BLD

## (undated) DEVICE — GLOVE BIOGEL SKINSENSE PI 6.5

## (undated) DEVICE — ELECTRODE REM PLYHSV RETURN 9

## (undated) DEVICE — UNDERGLOVES BIOGEL PI SZ 7 LF

## (undated) DEVICE — TIP RUMI MANIPULATOR LAVENDER

## (undated) DEVICE — SYR 10CC LUER LOCK

## (undated) DEVICE — SUT MCRYL PLUS 4-0 PS2 27IN

## (undated) DEVICE — NDL HYPO REG 25G X 1 1/2

## (undated) DEVICE — CLOSURE SKIN STERI STRIP 1/2X4

## (undated) DEVICE — SEALER LIGASURE LAP 37CM 5MM

## (undated) DEVICE — SOL CLEARIFY VISUALIZATION LAP

## (undated) DEVICE — TROCAR KII FIOS 5MM X 100MM